# Patient Record
Sex: MALE | Race: WHITE | NOT HISPANIC OR LATINO | Employment: FULL TIME | URBAN - METROPOLITAN AREA
[De-identification: names, ages, dates, MRNs, and addresses within clinical notes are randomized per-mention and may not be internally consistent; named-entity substitution may affect disease eponyms.]

---

## 2017-04-04 ENCOUNTER — OFFICE VISIT - HEALTHEAST (OUTPATIENT)
Dept: INTERNAL MEDICINE | Facility: CLINIC | Age: 32
End: 2017-04-04

## 2017-04-04 DIAGNOSIS — R06.2 WHEEZING: ICD-10-CM

## 2017-04-04 DIAGNOSIS — R05.9 COUGH: ICD-10-CM

## 2017-05-25 ENCOUNTER — COMMUNICATION - HEALTHEAST (OUTPATIENT)
Dept: INTERNAL MEDICINE | Facility: CLINIC | Age: 32
End: 2017-05-25

## 2017-05-25 DIAGNOSIS — E03.9 HYPOTHYROID: ICD-10-CM

## 2017-09-25 ENCOUNTER — OFFICE VISIT - HEALTHEAST (OUTPATIENT)
Dept: INTERNAL MEDICINE | Facility: CLINIC | Age: 32
End: 2017-09-25

## 2017-09-25 DIAGNOSIS — E03.9 HYPOTHYROIDISM: ICD-10-CM

## 2017-09-25 DIAGNOSIS — Z87.891 HISTORY OF NICOTINE USE: ICD-10-CM

## 2017-09-25 DIAGNOSIS — H66.90 AOM (ACUTE OTITIS MEDIA): ICD-10-CM

## 2017-09-26 ENCOUNTER — COMMUNICATION - HEALTHEAST (OUTPATIENT)
Dept: INTERNAL MEDICINE | Facility: CLINIC | Age: 32
End: 2017-09-26

## 2017-10-13 ENCOUNTER — COMMUNICATION - HEALTHEAST (OUTPATIENT)
Dept: INTERNAL MEDICINE | Facility: CLINIC | Age: 32
End: 2017-10-13

## 2017-10-13 ENCOUNTER — AMBULATORY - HEALTHEAST (OUTPATIENT)
Dept: INTERNAL MEDICINE | Facility: CLINIC | Age: 32
End: 2017-10-13

## 2017-10-13 DIAGNOSIS — H66.90 AOM (ACUTE OTITIS MEDIA): ICD-10-CM

## 2017-11-16 ENCOUNTER — COMMUNICATION - HEALTHEAST (OUTPATIENT)
Dept: INTERNAL MEDICINE | Facility: CLINIC | Age: 32
End: 2017-11-16

## 2017-11-16 DIAGNOSIS — E03.9 HYPOTHYROID: ICD-10-CM

## 2018-08-06 ENCOUNTER — COMMUNICATION - HEALTHEAST (OUTPATIENT)
Dept: INTERNAL MEDICINE | Facility: CLINIC | Age: 33
End: 2018-08-06

## 2018-08-06 DIAGNOSIS — E03.9 HYPOTHYROID: ICD-10-CM

## 2018-08-27 ENCOUNTER — OFFICE VISIT - HEALTHEAST (OUTPATIENT)
Dept: INTERNAL MEDICINE | Facility: CLINIC | Age: 33
End: 2018-08-27

## 2018-08-27 DIAGNOSIS — Z13.220 LIPID SCREENING: ICD-10-CM

## 2018-08-27 DIAGNOSIS — Z00.00 ANNUAL PHYSICAL EXAM: ICD-10-CM

## 2018-08-27 DIAGNOSIS — E03.9 HYPOTHYROID: ICD-10-CM

## 2018-08-27 DIAGNOSIS — E05.00 GRAVES DISEASE: ICD-10-CM

## 2018-08-27 DIAGNOSIS — E89.0 POSTABLATIVE HYPOTHYROIDISM: ICD-10-CM

## 2018-08-27 LAB
CHOLEST SERPL-MCNC: 155 MG/DL
FASTING STATUS PATIENT QL REPORTED: YES
FASTING STATUS PATIENT QL REPORTED: YES
GLUCOSE BLD-MCNC: 84 MG/DL (ref 70–125)
HDLC SERPL-MCNC: 48 MG/DL
LDLC SERPL CALC-MCNC: 92 MG/DL
T4 FREE SERPL-MCNC: 1.2 NG/DL (ref 0.7–1.8)
TRIGL SERPL-MCNC: 73 MG/DL
TSH SERPL DL<=0.005 MIU/L-ACNC: 0.51 UIU/ML (ref 0.3–5)

## 2018-08-27 ASSESSMENT — MIFFLIN-ST. JEOR: SCORE: 1952.31

## 2018-08-28 ENCOUNTER — COMMUNICATION - HEALTHEAST (OUTPATIENT)
Dept: INTERNAL MEDICINE | Facility: CLINIC | Age: 33
End: 2018-08-28

## 2018-08-28 LAB — T3 SERPL-MCNC: 80 NG/DL (ref 45–175)

## 2018-09-12 ENCOUNTER — COMMUNICATION - HEALTHEAST (OUTPATIENT)
Dept: INTERNAL MEDICINE | Facility: CLINIC | Age: 33
End: 2018-09-12

## 2018-11-14 ENCOUNTER — COMMUNICATION - HEALTHEAST (OUTPATIENT)
Dept: INTERNAL MEDICINE | Facility: CLINIC | Age: 33
End: 2018-11-14

## 2018-11-14 DIAGNOSIS — Z30.2 ENCOUNTER FOR VASECTOMY: ICD-10-CM

## 2018-11-21 ENCOUNTER — HOSPITAL ENCOUNTER (OUTPATIENT)
Dept: LAB | Age: 33
Setting detail: SPECIMEN
Discharge: HOME | End: 2018-11-21

## 2018-11-21 ENCOUNTER — OFFICE VISIT - HEALTHEAST (OUTPATIENT)
Dept: INTERNAL MEDICINE | Facility: CLINIC | Age: 33
End: 2018-11-21

## 2018-11-21 DIAGNOSIS — H92.13 OTORRHEA OF BOTH EARS: ICD-10-CM

## 2018-11-24 ENCOUNTER — COMMUNICATION - HEALTHEAST (OUTPATIENT)
Dept: INTERNAL MEDICINE | Facility: CLINIC | Age: 33
End: 2018-11-24

## 2018-11-24 LAB
BACTERIA SPEC CULT: ABNORMAL
BACTERIA SPEC CULT: ABNORMAL

## 2018-12-19 ENCOUNTER — OFFICE VISIT - HEALTHEAST (OUTPATIENT)
Dept: OTOLARYNGOLOGY | Facility: CLINIC | Age: 33
End: 2018-12-19

## 2018-12-19 ENCOUNTER — OFFICE VISIT - HEALTHEAST (OUTPATIENT)
Dept: AUDIOLOGY | Facility: CLINIC | Age: 33
End: 2018-12-19

## 2018-12-19 DIAGNOSIS — H73.13 CHRONIC MYRINGITIS OF BOTH EARS: ICD-10-CM

## 2018-12-27 ENCOUNTER — RECORDS - HEALTHEAST (OUTPATIENT)
Dept: ADMINISTRATIVE | Facility: OTHER | Age: 33
End: 2018-12-27

## 2019-01-25 ENCOUNTER — RECORDS - HEALTHEAST (OUTPATIENT)
Dept: ADMINISTRATIVE | Facility: OTHER | Age: 34
End: 2019-01-25

## 2019-08-13 ENCOUNTER — COMMUNICATION - HEALTHEAST (OUTPATIENT)
Dept: INTERNAL MEDICINE | Facility: CLINIC | Age: 34
End: 2019-08-13

## 2019-08-13 DIAGNOSIS — E03.9 HYPOTHYROID: ICD-10-CM

## 2019-10-09 ENCOUNTER — OFFICE VISIT - HEALTHEAST (OUTPATIENT)
Dept: INTERNAL MEDICINE | Facility: CLINIC | Age: 34
End: 2019-10-09

## 2019-10-09 ENCOUNTER — HOSPITAL ENCOUNTER (OUTPATIENT)
Dept: LAB | Age: 34
Setting detail: SPECIMEN
Discharge: HOME | End: 2019-10-09

## 2019-10-09 DIAGNOSIS — E03.9 HYPOTHYROID: ICD-10-CM

## 2019-10-09 DIAGNOSIS — Z00.00 ANNUAL PHYSICAL EXAM: ICD-10-CM

## 2019-10-09 LAB — TSH SERPL DL<=0.005 MIU/L-ACNC: 0.68 UIU/ML (ref 0.3–5)

## 2019-10-09 ASSESSMENT — MIFFLIN-ST. JEOR: SCORE: 1893.34

## 2020-10-12 ENCOUNTER — COMMUNICATION - HEALTHEAST (OUTPATIENT)
Dept: INTERNAL MEDICINE | Facility: CLINIC | Age: 35
End: 2020-10-12

## 2020-10-12 DIAGNOSIS — E03.9 HYPOTHYROID: ICD-10-CM

## 2020-12-01 ENCOUNTER — OFFICE VISIT - HEALTHEAST (OUTPATIENT)
Dept: INTERNAL MEDICINE | Facility: CLINIC | Age: 35
End: 2020-12-01

## 2020-12-01 DIAGNOSIS — E89.0 POSTABLATIVE HYPOTHYROIDISM: ICD-10-CM

## 2020-12-01 DIAGNOSIS — Z00.00 ANNUAL PHYSICAL EXAM: ICD-10-CM

## 2020-12-01 LAB — TSH SERPL DL<=0.005 MIU/L-ACNC: 2.95 UIU/ML (ref 0.3–5)

## 2020-12-01 ASSESSMENT — MIFFLIN-ST. JEOR: SCORE: 1861.59

## 2020-12-21 ENCOUNTER — COMMUNICATION - HEALTHEAST (OUTPATIENT)
Dept: FAMILY MEDICINE | Facility: CLINIC | Age: 35
End: 2020-12-21

## 2020-12-21 ENCOUNTER — RECORDS - HEALTHEAST (OUTPATIENT)
Dept: SCHEDULING | Facility: CLINIC | Age: 35
End: 2020-12-21

## 2020-12-21 DIAGNOSIS — E03.9 HYPOTHYROID: ICD-10-CM

## 2020-12-22 RX ORDER — LEVOTHYROXINE SODIUM 175 UG/1
175 TABLET ORAL DAILY
Qty: 90 TABLET | Refills: 3 | Status: SHIPPED | OUTPATIENT
Start: 2020-12-22 | End: 2021-10-26

## 2021-03-16 ENCOUNTER — OFFICE VISIT - HEALTHEAST (OUTPATIENT)
Dept: INTERNAL MEDICINE | Facility: CLINIC | Age: 36
End: 2021-03-16

## 2021-03-16 DIAGNOSIS — M79.675 PAIN OF TOE OF LEFT FOOT: ICD-10-CM

## 2021-03-16 DIAGNOSIS — S93.509A SPRAIN OF TOE, INITIAL ENCOUNTER: ICD-10-CM

## 2021-04-12 ENCOUNTER — COMMUNICATION - HEALTHEAST (OUTPATIENT)
Dept: ADMINISTRATIVE | Facility: CLINIC | Age: 36
End: 2021-04-12

## 2021-05-30 ENCOUNTER — RECORDS - HEALTHEAST (OUTPATIENT)
Dept: ADMINISTRATIVE | Facility: CLINIC | Age: 36
End: 2021-05-30

## 2021-05-30 VITALS — WEIGHT: 212 LBS

## 2021-05-31 ENCOUNTER — RECORDS - HEALTHEAST (OUTPATIENT)
Dept: ADMINISTRATIVE | Facility: CLINIC | Age: 36
End: 2021-05-31

## 2021-05-31 VITALS — WEIGHT: 230 LBS

## 2021-05-31 NOTE — TELEPHONE ENCOUNTER
Refill Approved    Rx renewed per Medication Renewal Policy. Medication was last renewed on 8/27/2018 for 90/3.  Last OV 8/27/2018  Flavia Dyer, [facility] Triage/Med Refill 8/14/2019     Requested Prescriptions   Pending Prescriptions Disp Refills     levothyroxine (SYNTHROID, LEVOTHROID) 175 MCG tablet [Pharmacy Med Name: LEVOTHYROXINE 0.175MG (175MCG) TABS] 90 tablet 0     Sig: TAKE 1 TABLET(175 MCG) BY MOUTH DAILY       Thyroid Hormones Protocol Passed - 8/13/2019  3:17 AM        Passed - Provider visit in past 12 months or next 3 months     Last office visit with prescriber/PCP: 11/21/2018 Jaclyn López FNP OR same dept: 11/21/2018 Jaclyn López FNP OR same specialty: 11/21/2018 Jaclyn López FNP  Last physical: Visit date not found Last MTM visit: Visit date not found   Next visit within 3 mo: Visit date not found  Next physical within 3 mo: Visit date not found  Prescriber OR PCP: RAMIRO Ortez  Last diagnosis associated with med order: 1. Hypothyroid  - levothyroxine (SYNTHROID, LEVOTHROID) 175 MCG tablet [Pharmacy Med Name: LEVOTHYROXINE 0.175MG (175MCG) TABS]; TAKE 1 TABLET(175 MCG) BY MOUTH DAILY  Dispense: 90 tablet; Refill: 0    If protocol passes may refill for 12 months if within 3 months of last provider visit (or a total of 15 months).             Passed - TSH on file in past 12 months for patient age 12 & older     TSH   Date Value Ref Range Status   08/27/2018 0.51 0.30 - 5.00 uIU/mL Final

## 2021-06-01 VITALS — WEIGHT: 231 LBS | HEIGHT: 68 IN | BODY MASS INDEX: 35.01 KG/M2

## 2021-06-02 VITALS — BODY MASS INDEX: 36.49 KG/M2 | WEIGHT: 240 LBS

## 2021-06-02 NOTE — PROGRESS NOTES
Assessment/Plan:     1. Annual physical exam  2. BMI 33.0-33.9,adult  - I recommended he eat a healthy diet and exercise on a regular basis.    3. Hypothyroid  - Thyroid Stimulating Hormone (TSH)  - levothyroxine (SYNTHROID, LEVOTHROID) 175 MCG tablet; Take 1 tablet (175 mcg total) by mouth daily.  Dispense: 90 tablet; Refill: 3      Subjective:     Gustavo Becerra is a 34 y.o. male who presents for an annual exam. He has no additional concerns today.     Hypothyroidism r/t BASSETT treatment for graves disease is treated with levothyroxine. He denies hyper/hypothyroid symptoms.     The patient reports that there is not domestic violence in his life.     Healthy Habits:   Regular Exercise: Yes, physically active with his new job working for [employer]   Sunscreen Use: Yes  Healthy Diet: Yes  Dental Visits Regularly: Yes    Immunization History   Administered Date(s) Administered     Td,adult,historic,unspecified 1985     Tdap 12/10/2012     Immunization status: declines influenza vaccine       Current Outpatient Medications   Medication Sig Dispense Refill     fluticasone (FLONASE) 50 mcg/actuation nasal spray 1 spray into each nostril daily. 16 g 0     levothyroxine (SYNTHROID, LEVOTHROID) 175 MCG tablet Take 1 tablet (175 mcg total) by mouth daily. 90 tablet 3     No current facility-administered medications for this visit.      Past Medical History:   Diagnosis Date     Disease of thyroid gland      Past Surgical History:   Procedure Laterality Date     ADENOIDECTOMY       Bee sting kit  Family History   Problem Relation Age of Onset     Cancer Mother         Primary liver cancer     Social History     Socioeconomic History     Marital status: Single     Spouse name: Not on file     Number of children: 2     Years of education: Not on file     Highest education level: Not on file   Occupational History     Occupation:     Social Needs     Financial resource strain: Not on file     Food insecurity:      Worry: Not on file     Inability: Not on file     Transportation needs:     Medical: Not on file     Non-medical: Not on file   Tobacco Use     Smoking status: Former Smoker     Packs/day: 0.25     Years: 10.00     Pack years: 2.50     Last attempt to quit: 2017     Years since quittin.7     Smokeless tobacco: Never Used   Substance and Sexual Activity     Alcohol use: Yes     Alcohol/week: 6.0 standard drinks     Types: 6 Cans of beer per week     Comment: Maximum off 6/week, could be fewer.  TBrinkMD - 14     Drug use: No     Sexual activity: Yes     Partners: Female   Lifestyle     Physical activity:     Days per week: Not on file     Minutes per session: Not on file     Stress: Not on file   Relationships     Social connections:     Talks on phone: Not on file     Gets together: Not on file     Attends Holiness service: Not on file     Active member of club or organization: Not on file     Attends meetings of clubs or organizations: Not on file     Relationship status: Not on file     Intimate partner violence:     Fear of current or ex partner: Not on file     Emotionally abused: Not on file     Physically abused: Not on file     Forced sexual activity: Not on file   Other Topics Concern     Not on file   Social History Narrative    , 3 children        Review of Systems  General:  Negative except as noted above  Eyes: Negative except as noted above  Ears/Nose/Throat: Negative except as noted above  Cardiovascular: Negative except as noted above  Respiratory:  Negative except as noted above  Gastrointestinal:  Negative except as noted above  Musculoskeletal:  Negative except as noted above  Skin: Negative except as noted above  Neurologic: Negative except as noted above  Psychiatric: Negative except as noted above  Endocrine: Negative except as noted above  Heme/Lymphatic: Negative except as noted above   Allergic/Immunologic: Negative except as noted above      Objective:      Vitals:     "10/09/19 1209   BP: 110/72   Pulse: 80   Weight: 218 lb (98.9 kg)   Height: 5' 8\" (1.727 m)     Wt Readings from Last 3 Encounters:   10/09/19 218 lb (98.9 kg)   11/21/18 (!) 240 lb (108.9 kg)   08/27/18 (!) 231 lb (104.8 kg)     Body mass index is 33.15 kg/m .     Physical Exam:  Constitutional: Well developed, well nourished, no acute distress.  HEENT: normocephalic/atraumatic, PERRLA/EOMI, TMs: Gray, normal light reflex, no nasal discharge.  Oral mucosa: moist; no erythema/exudate  Neck: No LAD/masses/thyromegaly/bruits  Lungs: clear bilaterally  Heart: regular rate and rhythm, no murmurs/gallops/rubs  Abdomen: Normal bowel sounds, soft, non-tender, non-distended, no masses  Lymphatics: no supraclavicular/cervical LAD. No edema.  Neuro: A&O x 3  Musculoskeletal: no gross deformities.  Skin: no rashes or lesions.  Psych: Behavior appropriate, engaging.  Thought processes congruent, non-tangential        "

## 2021-06-03 VITALS
BODY MASS INDEX: 33.04 KG/M2 | WEIGHT: 218 LBS | SYSTOLIC BLOOD PRESSURE: 110 MMHG | HEIGHT: 68 IN | HEART RATE: 80 BPM | DIASTOLIC BLOOD PRESSURE: 72 MMHG

## 2021-06-05 VITALS
WEIGHT: 211 LBS | SYSTOLIC BLOOD PRESSURE: 124 MMHG | DIASTOLIC BLOOD PRESSURE: 74 MMHG | BODY MASS INDEX: 32.08 KG/M2 | HEART RATE: 80 BPM

## 2021-06-05 VITALS
DIASTOLIC BLOOD PRESSURE: 74 MMHG | BODY MASS INDEX: 31.98 KG/M2 | SYSTOLIC BLOOD PRESSURE: 120 MMHG | HEIGHT: 68 IN | HEART RATE: 76 BPM | WEIGHT: 211 LBS

## 2021-06-09 NOTE — PROGRESS NOTES
[facility] Note  Patient Name: Gustavo Becerra  Patient Age: 31 y.o.  YOB: 1985  MRN: [id number]  ?  Date of Visit: 4/4/2017  Reason for Office Visit:   Chief Complaint   Patient presents with     Follow-up     got influenza last week, still not getting better, having a hard time breathing       HPI: Gustavo Becerra 31 y.o. male who presents to clinic for headache, productive cough of green mucus, ears plugged, wheezing, tactile fever. These symptoms started back over the weekend 2 days ago. No chest pain, sob, lou. [facility], was told he had the flu without testing. Was given 5 days off work. Started to feel better then on Sunday started coughing again.       Review of Systems: As noted in HPI     Current Scheduled Meds:  Outpatient Encounter Prescriptions as of 4/4/2017   Medication Sig Dispense Refill     levothyroxine (SYNTHROID, LEVOTHROID) 175 MCG tablet TAKE ONE TABLET BY MOUTH ONE TIME DAILY 90 tablet 2     albuterol (PROVENTIL HFA;VENTOLIN HFA) 90 mcg/actuation inhaler Inhale 2 puffs every 6 (six) hours as needed for wheezing. 1 each 0     No facility-administered encounter medications on file as of 4/4/2017.        Objective / Physical Examination:  /82  Pulse (!) 103  Temp 98.9  F (37.2  C) (Oral)   Wt 212 lb (96.2 kg)  SpO2 98%  Wt Readings from Last 3 Encounters:   04/04/17 212 lb (96.2 kg)   05/16/16 194 lb 12.8 oz (88.4 kg)   12/12/14 201 lb (91.2 kg)     There is no height or weight on file to calculate BMI. (>25?)    General Appearance: Alert and oriented in no acute distress  Ears: Tympanic membrane clear with landmarks well visualized bilaterally  Eyes: Conjunctivae clear   Nose: Septum midline, nares patent, mucosa moist and without drainage  Throat: Lips and mucosa moist pharynx without erythema or exudate  Neck: No cervical adenopathy.   Lungs: Clear to auscultation bilaterally. Normal inspiratory and expiratory effort. No  w/r/r  Cardiovascular: RRR   Abdomen: Bowel sounds active all four quadrants. Soft, non-tender.   Integumentary: Warm and dry    Assessment / Plan / Medical Decision Making:      Encounter Diagnoses   Name Primary?     Cough Yes     Wheezing         1. Cough    2 days of productive cough. Lungs CTA. Slightly tachy on exam, otherwise hemodynamically stable. Will treat this symptomatically as a viral infection. Given albuterol inhaler and advised mucinex      - albuterol (PROVENTIL HFA;VENTOLIN HFA) 90 mcg/actuation inhaler; Inhale 2 puffs every 6 (six) hours as needed for wheezing.  Dispense: 1 each; Refill: 0    2. Wheezing albuterol as above    Follow up if not improving or getting worse    Total time spent with patient was 15 minutes with >50% of time spent in face-to-face counseling regarding the above plan     Demarcus Benites MD  [facility]

## 2021-06-12 NOTE — TELEPHONE ENCOUNTER
RN cannot approve Refill Request    RN can NOT refill this medication Protocol failed and NO refill given. Last office visit: 10/9/2019 Jaclyn López FNP Last Physical: Visit date not found Last MTM visit: Visit date not found Last visit same specialty: 10/9/2019 Jaclyn López FNP.  Next visit within 3 mo: Visit date not found  Next physical within 3 mo: Visit date not found      Ellie Black, [facility] Triage/Med Refill 10/13/2020    Requested Prescriptions   Pending Prescriptions Disp Refills     levothyroxine (SYNTHROID, LEVOTHROID) 175 MCG tablet [Pharmacy Med Name: LEVOTHYROXINE 0.175MG (175MCG) TABS] 90 tablet 3     Sig: TAKE 1 TABLET BY MOUTH EVERY DAY       Thyroid Hormones Protocol Failed - 10/12/2020  6:38 PM        Failed - Provider visit in past 12 months or next 3 months     Last office visit with prescriber/PCP: 10/9/2019 Jaclyn López FNP OR same dept: Visit date not found OR same specialty: 10/9/2019 Jaclyn López FNP  Last physical: Visit date not found Last MTM visit: Visit date not found   Next visit within 3 mo: Visit date not found  Next physical within 3 mo: Visit date not found  Prescriber OR PCP: RAMIRO Ortez  Last diagnosis associated with med order: 1. Hypothyroid  - levothyroxine (SYNTHROID, LEVOTHROID) 175 MCG tablet [Pharmacy Med Name: LEVOTHYROXINE 0.175MG (175MCG) TABS]; TAKE 1 TABLET BY MOUTH EVERY DAY  Dispense: 90 tablet; Refill: 3    If protocol passes may refill for 12 months if within 3 months of last provider visit (or a total of 15 months).             Failed - TSH on file in past 12 months for patient age 12 & older     TSH   Date Value Ref Range Status   10/09/2019 0.68 0.30 - 5.00 uIU/mL Final

## 2021-06-13 NOTE — TELEPHONE ENCOUNTER
Reason for Call:  Medication or medication refill: refilll wants 90 day supply    Do you use a [facility]?  Name of the pharmacy and phone number for the current request: [facility] [address]    Name of the medication requested: SYNTHROID    Other request: NONE    Can we leave a detailed message on this number? Yes    Phone number patient can be reached at:   Cell number on file:    Telephone Information:   Mobile [phone]       Best Time: NONE    Call taken on 12/21/2020 at 4:16 PM by Dmitriy Castle

## 2021-06-13 NOTE — PROGRESS NOTES
Assessment/Plan:     1. Annual physical exam  2. BMI 33.0-33.9,adult  3. Postablative hypothyroidism  I recommended he eat a healthy diet and exercise on a regular basis.  Recommend reducing alcohol intake   - Thyroid Stimulating Hormone (TSH)           Subjective:     Gustavo Becerra is a 35 y.o. male who presents for an annual exam. He has continued to work without interruption throughout the pandemic. His children are doing virtual learning which has been difficult.     He has postablative hypothyroidism, does not notice any symptoms to suggest under or over replacement with levothyroxine.     The patient reports that there is not domestic violence in his life.     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 Has continued to lose weight with intentional measures   Dental Visits Regularly: Yes      Immunization History   Administered Date(s) Administered     Influenza, inj, historic,unspecified 12/24/2018, 10/10/2019     Td,adult,historic,unspecified 1985     Tdap 12/10/2012           Current Outpatient Medications   Medication Sig Dispense Refill     fluticasone (FLONASE) 50 mcg/actuation nasal spray 1 spray into each nostril daily. 16 g 0     levothyroxine (SYNTHROID, LEVOTHROID) 175 MCG tablet Take 1 tablet (175 mcg total) by mouth daily. Due for appointment 90 tablet 0     No current facility-administered medications for this visit.      Past Medical History:   Diagnosis Date     Disease of thyroid gland      Past Surgical History:   Procedure Laterality Date     ADENOIDECTOMY       Bee sting kit  Family History   Problem Relation Age of Onset     Cancer Mother         Primary liver cancer     Social History     Socioeconomic History     Marital status: Single     Spouse name: Not on file     Number of children: 2     Years of education: Not on file     Highest education level: Not on file   Occupational  History     Occupation:     Social Needs     Financial resource strain: Not on file     Food insecurity     Worry: Not on file     Inability: Not on file     Transportation needs     Medical: Not on file     Non-medical: Not on file   Tobacco Use     Smoking status: Former Smoker     Packs/day: 0.25     Years: 10.00     Pack years: 2.50     Quit date: 01/2017     Years since quitting: 3.9     Smokeless tobacco: Never Used   Substance and Sexual Activity     Alcohol use: Yes     Alcohol/week: 12.0 standard drinks     Types: 12 Cans of beer per week     Drug use: No     Sexual activity: Yes     Partners: Female   Lifestyle     Physical activity     Days per week: Not on file     Minutes per session: Not on file     Stress: Not on file   Relationships     Social connections     Talks on phone: Not on file     Gets together: Not on file     Attends Latter day service: Not on file     Active member of club or organization: Not on file     Attends meetings of clubs or organizations: Not on file     Relationship status: Not on file     Intimate partner violence     Fear of current or ex partner: Not on file     Emotionally abused: Not on file     Physically abused: Not on file     Forced sexual activity: Not on file   Other Topics Concern     Not on file   Social History Narrative    , 3 children        Review of Systems  General:  Negative except as noted above  Eyes: Negative except as noted above  Ears/Nose/Throat: Negative except as noted above  Cardiovascular: Negative except as noted above  Respiratory:  Negative except as noted above  Gastrointestinal:  Negative except as noted above  Musculoskeletal:  Negative except as noted above  Skin: Negative except as noted above  Neurologic: Negative except as noted above  Psychiatric: Negative except as noted above  Endocrine: Negative except as noted above  Heme/Lymphatic: Negative except as noted above   Allergic/Immunologic: Negative except as noted  "above      Objective:      Vitals:    12/01/20 0804   BP: 120/74   Pulse: 76   Weight: 211 lb (95.7 kg)   Height: 5' 8\" (1.727 m)     Wt Readings from Last 3 Encounters:   12/01/20 211 lb (95.7 kg)   10/09/19 218 lb (98.9 kg)   11/21/18 (!) 240 lb (108.9 kg)     Body mass index is 32.08 kg/m .     Physical Exam:  Constitutional: Well developed, well nourished, no acute distress.  HEENT: normocephalic/atraumatic. Eyes non-icteric. TMs: Gray, normal light reflex, no nasal discharge.  Oral mucosa: moist; no erythema/exudate  Neck: No LAD/masses/thyromegaly/bruits  Lungs: clear bilaterally  Heart: regular rate and rhythm, no murmurs/gallops/rubs  Abdomen: Normal bowel sounds, soft, non-tender, non-distended, no masses  Lymphatics: no supraclavicular/cervical LAD. No edema.  Neuro: A&O x 3  Musculoskeletal: no gross deformities.  Skin: no rashes or lesions.  Psych: Behavior appropriate, engaging.  Thought processes congruent, non-tangential        "

## 2021-06-13 NOTE — PROGRESS NOTES
Internal Medicine Office Visit  Patient Name: Gustavo Becerra  Patient Age: 32 y.o.  YOB: 1985  MRN: [id number]  ?  Date of Visit: 2017  Reason for Office Visit:   Chief Complaint   Patient presents with     Establish Care       Assessment / Plan / Medical Decision Makin. Hypothyroidism, after BASSETT  2. AOM (acute otitis media)  3. History of nicotine use  - Check thyroid cascade today, consider increasing levothyroxine if TSH is higher than usual d/t weight gain  - Amoxicillin for bilateral ear infection   - Per patient, biometric screening done through work and normal with the exception of a high blood pressure which is normal today in the office  - Follow up in 1 year      Health Maintenance Review  Health Maintenance   Topic Date Due     ADVANCE DIRECTIVES DISCUSSED WITH PATIENT  2003     INFLUENZA VACCINE RULE BASED (1) 2017     TD 18+ HE  12/10/2022     TDAP ADULT ONE TIME DOSE  Completed         I have discontinued Mr. Becerra's albuterol. I am also having him start on amoxicillin. Additionally, I am having him maintain his levothyroxine.     HPI:   Encounter Diagnoses   Name Primary?     Hypothyroidism, after BASSETT Yes     AOM (acute otitis media)      History of nicotine use       Gustavo Becerra is a 31 y/o male who presents to the office today to establish care and for bilateral ear pain.     C/o 2 months of a white colored discharge from the ears, it has a foul odor. Drainage is clear to white colored. He has been using a solution of peroxide in the ears which doesn't seem to have resolved the issue. He has intermittent pain in the ears. He has to wear an earpiece at work, he thinks this may be related. He has more difficulty hearing than usual. In the past 1 week it seems slightly better.     Hypothyroidism- He has had weight gain since January. He has tried cutting back on his portion sizes and increased physical activity. He has been unable to lose weight. He has  dry skin patches in the winter, nothing recently. He has been on levothyroxine 175 mcg for the past 2 years approximately with TSH in a normal range with this dose.     Tobacco use- stopped smoking in January.     His wife is expecting another child any day now. This will be #3.     Review of Systems: If positive, the following ROS is bolded:  Constitutional: Fever, chills, night sweats fainting, weight gain  Eyes: Visual changes, eye pain, double vision  HENT: Changes in hearing, ear pain, tinnitus, hoarseness, difficulty swallowing  Respiratory: Wheeze, shortness of breath, cough, and exercise intolerance   Cardiovascular: Chest pain, dyspnea, tachycardia, palpitations, syncope, dizziness or lightheadedness  Gastrointestinal: Nausea, vomiting, diarrhea, dyspepsia, irregular BMs, and melena   Genitourinary: Dysuria, frequency, hematuria, nocturia  Integumentary: Pruritis, rashes, lesions, wounds   Musculoskeletal: Muscular or joint pain, difficulty with range of motion  Neurological: Changes in balance, mental status, paresthesias, weakness, headache  Behavioral/Psych: Difficulty concentrating, excessive moodiness, depression or anxiety, insomnia    Current Scheduled Meds:  Outpatient Encounter Prescriptions as of 9/25/2017   Medication Sig Dispense Refill     levothyroxine (SYNTHROID, LEVOTHROID) 175 MCG tablet TAKE 1 TABLET DAILY 90 tablet 0     amoxicillin (AMOXIL) 875 MG tablet Take 1 tablet (875 mg total) by mouth 2 (two) times a day for 10 days. 20 tablet 0     [DISCONTINUED] albuterol (PROVENTIL HFA;VENTOLIN HFA) 90 mcg/actuation inhaler Inhale 2 puffs every 6 (six) hours as needed for wheezing. 1 each 0     No facility-administered encounter medications on file as of 9/25/2017.      Past Medical History:   Diagnosis Date     Disease of thyroid gland      Past Surgical History:   Procedure Laterality Date     ADENOIDECTOMY       Social History   Substance Use Topics     Smoking status: Former Smoker      Packs/day: 0.25     Years: 10.00     Quit date: 01/2017     Smokeless tobacco: Never Used     Alcohol use 3.6 oz/week     6 Cans of beer per week      Comment: Maximum off 6/week, could be fewer.  TBrinkMD - 12/12/14       Objective / Physical Examination:  Vitals:    09/25/17 0701   BP: 124/70   Pulse: 68   Weight: (!) 230 lb (104.3 kg)     Wt Readings from Last 3 Encounters:   09/25/17 (!) 230 lb (104.3 kg)   04/04/17 212 lb (96.2 kg)   05/16/16 194 lb 12.8 oz (88.4 kg)     There is no height or weight on file to calculate BMI.      General Appearance: Alert and oriented, cooperative, affect appropriate, speech clear, in no apparent distress  Head: Normocephalic, atraumatic  Ears: Tympanic membrane with marked erythema and landmarks obliterated. No drainage in ear canals.   Throat: Lips and mucosa moist. Teeth in good repair, pharynx without erythema or exudate  Neck: Supple, trachea midline. No cervical adenopathy. No thyromegaly or thyroid nodules   Lungs: Clear to auscultation bilaterally. Normal inspiratory and expiratory effort  Cardiovascular: Regular rate, normal S1, S2. No murmurs, rubs, or gallops      Orders Placed This Encounter   Procedures     Thyroid [place]   Followup: Return in about 1 year (around 9/25/2018) for Annual physical. earlier if needed.        Jaclyn López, CNP  [facility]

## 2021-06-16 NOTE — TELEPHONE ENCOUNTER
Patient calling this morning to report that his employer has faxed over some paperwork to our office for Jaclyn López to complete and fax back to his work.  He just wanted to give heads up that it's coming if she hasn't gotten it already.    Please call patient with any questions  [phone]

## 2021-06-16 NOTE — PROGRESS NOTES
Internal Medicine Office Visit  [facility]   Patient Name: Gustavo Becerra  Patient Age: 35 y.o.  YOB: 1985  MRN: [id number]    Date of Visit: 3/16/2021  Reason for Office Visit:   Chief Complaint   Patient presents with     Toe Injury     Lt foot, first 3 toes           Assessment / Plan / Medical Decision Making:    Problem List Items Addressed This Visit     None      Visit Diagnoses     Sprain of toe, initial encounter    -  Primary    - Xray results were personally reviewed, I do not appreciate any fractures or dislocations on the imaging.   - Continue ice for 72 hours, elevate, and use NSAID/acetaminophen  - Use post-op or hard soled shoe   - Take 3/18 off work. Lifting restriction until 3/23 of 15 lbs. Can call me if this is not feasible and I can extend the restriction     Relevant Orders    Ankle/Foot DME: Post-op Shoe; Left    Pain of toe of left foot        Relevant Orders    XR Foot Left 3 or More VWS Standing           I am having Jordan Becerra maintain his fluticasone propionate and levothyroxine.          Orders Placed This Encounter   Procedures     Ankle/Foot DME: Post-op Shoe; Left     XR Foot Left 3 or More VWS Standing   Followup: Return in about 2 weeks (around 3/30/2021), or if symptoms worsen or fail to improve. earlier if needed.        Jaclyn López CNP        HPI:  Gustavo Becerra is a 35 y.o. year old who presents to the office today for a left foot injury. He ran into a door frame yesterday when he was playing with his kids. He has swelling and slightly discolored. He has pain with walking and holding any weight. 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         Health Maintenance Review  Health Maintenance   Topic Date Due     ADVANCE CARE PLANNING  03/16/2051 (Originally 5/12/2003)     PREVENTIVE CARE VISIT  12/01/2021     TD 18+ HE  12/10/2022     LIPID  08/27/2023     TDAP ADULT ONE TIME DOSE  Completed      Pneumococcal Vaccine: Pediatrics (0 to 5 Years) and At-Risk Patients (6 to 64 Years)  Aged Out     HEPATITIS B VACCINES  Aged Out     HEPATITIS C SCREENING  Discontinued     HIV SCREENING  Discontinued     INFLUENZA VACCINE RULE BASED  Discontinued       Current Scheduled Meds:  Outpatient Encounter Medications as of 3/16/2021   Medication Sig Dispense Refill     fluticasone (FLONASE) 50 mcg/actuation nasal spray 1 spray into each nostril daily. 16 g 0     levothyroxine (SYNTHROID, LEVOTHROID) 175 MCG tablet Take 1 tablet (175 mcg total) by mouth daily. 90 tablet 3     No facility-administered encounter medications on file as of 3/16/2021.           Objective / Physical Examination:  Vitals:    03/16/21 1317   BP: 124/74   Pulse: 80   Weight: 211 lb (95.7 kg)     Wt Readings from Last 3 Encounters:   03/16/21 211 lb (95.7 kg)   12/01/20 211 lb (95.7 kg)   10/09/19 218 lb (98.9 kg)     Body mass index is 32.08 kg/m .     Constitutional: In no apparent distress  Cardiovascular: DP pulse 2+ left foot   MSK: mild soft tissues swelling over the medial dorsum of the left foot. Tenderness to palpation of the distal metatarsals 1-3 and MTP joints. Movement of the toes is intact. Sensation is intact   Skin: my mild ecchymosis over distal metatarsals and MTP joint

## 2021-06-18 NOTE — PATIENT INSTRUCTIONS - HE
Patient Instructions by Jaclyn López FNP at 3/16/2021  1:15 PM     Author: Jaclyn López FNP Service: -- Author Type: Nurse Practitioner    Filed: 3/16/2021  1:35 PM Encounter Date: 3/16/2021 Status: Signed    : Jaclyn López FNP (Nurse Practitioner)       Patient Education     Toe Sprain  A sprain is a stretching or tearing of the ligaments that hold a joint together. There are no broken bones. Sprains generally take from 3-6 weeks to heal. A toe sprain may be treated by taping the injured toe to the next toe. This is called buddy taping. This protects the injured toe and holds it in position. Mild sprains may not need any additional support. If the toenail has been hurt badly, it may fall off in 1-2 weeks. A new one will usually start to grow back within a month.     Home care    Keep your leg elevated when sitting or lying down. This is very important during the first 48 hours to reduce swelling. Stay off the injured foot as much as possible until you can walk on it without pain. If needed, you may use crutches during the first week for this purpose. Crutches can be rented at many pharmacies or surgical/orthopedic supply stores.    You may be given a cast shoe to wear to prevent movement in your toe. If not, you can use a sandal or any shoe that does not put pressure on the injured toe until the swelling and pain go away. If using a sandal, be careful not to hit your foot against anything, since another injury could make the sprain worse.    Apply an ice pack over the injured area for 15 to 20 minutes every 3 to 6 hours. You should do this for the first 24 to 48 hours. You can make an ice pack by filling a plastic bag that seals at the top with ice cubes and then wrapping it with a thin towel. Continue to use ice packs for relief of pain and swelling as needed. As the ice melts, be careful to avoid getting your wrap, splint, or cast wet. As the ice melts, be careful to avoid getting  any wrap, splint, tape, or cast wet. After 48 hours, apply heat from a warm shower or bath for 20 minutes several times daily. Alternating ice and heat may also be helpful.    If jac tape was applied and it becomes wet or dirty, change it. You may replace it with paper, plastic or cloth tape. Cloth tape and paper tapes must be kept dry. Apply gauze or cotton padding between the toes, especially near webbed area. This will help prevent the skin from getting moist and breaking down. Keep the jac tape in place for at least 4 weeks, or as advised by your healthcare provider.    You may use over-the-counter pain medicine to control pain, unless another pain medicine was prescribed. If you have chronic liver or kidney disease or ever had a stomach ulcer or GI bleeding, talk with your healthcare provider before using these medicines.    You may return to sports after healing, when you can run without pain.  Follow-up care  Follow up with your with your healthcare provider as advised. Sometimes fractures dont show up on the first X-ray. Bruises and sprains can sometimes hurt as much as a fracture. These injuries can take time to heal completely. If your symptoms dont improve or they get worse, talk with your healthcare provider. You may need a repeat X-ray. If X-rays were taken, you will be told of any new findings that may affect your care.  When to seek medical advice  Call your healthcare provider right away if any of these occur:    Redness, warmth, or fluid drainage from your toe    Pain or swelling increases    Toes become cold, blue, numb, or tingly  Date Last Reviewed: 11/20/2015 2000-2017 [employer]. [address] [place] [address], [place] [address]. All rights reserved. This information is not intended as a substitute for professional medical care. Always follow your healthcare professional's instructions.

## 2021-06-20 NOTE — PROGRESS NOTES
"~ You WILL get better~    GENERAL ADVICE  ~ Gradually ease back into your usual activities.  ~ Rest as needed to help your symptoms go away.   - Consider pairing 50 minutes of activity with 10 minutes of rest.  ~ Allow yourself more time for activities.  ~ Write things down.  At home, at work, whenever there is something that you should remember, even it is simple.    SCREENS  ~ Change settings on your phone and computer using the \"Blue Light Filter\" (Night Shift on all [employer] products)   ~ The goal is making screens more yellow and less blue.     ~ If this is not an option you can download this program: ExecNote, to adjust your screen resolution.  ~ Turn screen brightness down  ~ Increase font size    HEADACHE  ~ Take tylenol (1000 mg) three times a day as needed  ~ Take ibuprofen (600 mg) three times a day as needed (take with food)    NECK PAIN  ~ Ice or Heat are good~  ~ Massage is ok if it doesn't trigger more symptoms~  ~ Gentle stretches and range-of-motion are helpful    FATIGUE  ~ Daily exercise is strongly encouraged.  Start with a 10 min walk and increase the time as tolerated until you are walking 30 minutes per day.      ANXIETY OR MOOD SWINGS:  ~ If you are irritable or anxious, take a break in a quiet room.  ~ Try using the free Calm gary for guided breathing and mindfulness/meditation.  ~ Explore Agilys (https://www.headspace.com) for free and easy-to-use meditation guidance.    Diet:  - In principle incorporate more protein, lots of veggies, some fruit, whole grains.    - Less sweets and saturated fat.   - Mediterranean Diet is an easy-to-follow example.  ~ Drink plenty of water throughout the day (8-10 glasses per day)  " Assessment/Plan:     1. Annual physical exam  2. BMI 35.0-35.9,adult  - I recommended he eat a healthy diet and exercise on a regular basis.  - The following high BMI interventions were performed this visit: encouragement to exercise  - Will complete insurance form for patient and fax plus mail him a copy when results are back   - Lipid Profile  - Glucose  - [facility] LAV      3. Postablative hypothyroidism  4. Graves disease - treated with BASSETT.  - Thyroid Stimulating Hormone (TSH)  - T3, Total  - T4, Free    5. Lipid screening  - lipid panel today     6. Hypothyroid  - levothyroxine (SYNTHROID, LEVOTHROID) 175 MCG tablet; Take 1 tablet (175 mcg total) by mouth daily.  Dispense: 90 tablet; Refill: 3        Subjective:     Gustavo Becerra is a 33 y.o. male who presents for an annual exam.  He needs to have fasting lab work done to complete an insurance form which offers him a discount for doing so.  He does not have any new concerns today.    No smoking, quite one year ago.     Hypothyroidism- feels more bloated ever since taking levothyroxine which is entirely unchanged every since age 21 when he was treated.  He otherwise denies any concerns with taking levothyroxine and does not have any symptoms of hyper or hypothyroidism.    The patient reports that there is not domestic violence in his life.     Healthy Habits:   Regular Exercise: Yes, chasing after 3 kids now. Active with sports such as football/t ball with his kids   Sunscreen Use: Yes  Healthy Diet: Yes  Dental Visits Regularly: Yes        Immunization History   Administered Date(s) Administered     Td,adult,historic,unspecified 1985     Tdap 12/10/2012     Immunization status: up to date and documented.        Current Outpatient Prescriptions   Medication Sig Dispense Refill     levothyroxine (SYNTHROID, LEVOTHROID) 175 MCG tablet Take 1 tablet (175 mcg total) by mouth daily. 90 tablet 3     No current facility-administered medications for this visit.   "    Past Medical History:   Diagnosis Date     Disease of thyroid gland      Past Surgical History:   Procedure Laterality Date     ADENOIDECTOMY       Bee sting kit  Family History   Problem Relation Age of Onset     Cancer Mother      Primary liver cancer     Social History     Social History     Marital status: Single     Spouse name: N/A     Number of children: 2     Years of education: N/A     Occupational History            Social History Main Topics     Smoking status: Former Smoker     Packs/day: 0.25     Years: 10.00     Quit date: 01/2017     Smokeless tobacco: Never Used     Alcohol use 3.6 oz/week     6 Cans of beer per week      Comment: Maximum off 6/week, could be fewer.  TBrinkMD - 12/12/14     Drug use: No     Sexual activity: Yes     Partners: Female     Other Topics Concern     Not on file     Social History Narrative    , 3 children        Review of Systems  General:  Negative except as noted above  Eyes: Negative except as noted above  Ears/Nose/Throat: Negative except as noted above  Cardiovascular: Negative except as noted above  Respiratory:  Negative except as noted above  Gastrointestinal:  Negative except as noted above  Musculoskeletal:  Negative except as noted above  Skin: Negative except as noted above  Neurologic: Negative except as noted above  Psychiatric: Negative except as noted above  Endocrine: Negative except as noted above  Heme/Lymphatic: Negative except as noted above   Allergic/Immunologic: Negative except as noted above      Objective:      Vitals:    08/27/18 0907   BP: 124/70   Pulse: 78   Weight: (!) 231 lb (104.8 kg)   Height: 5' 8\" (1.727 m)     Wt Readings from Last 3 Encounters:   08/27/18 (!) 231 lb (104.8 kg)   09/25/17 (!) 230 lb (104.3 kg)   04/04/17 212 lb (96.2 kg)     Body mass index is 35.12 kg/(m^2).     Physical Exam:  Constitutional: Well developed, well nourished, no acute distress.  HEENT: normocephalic/atraumatic, PERRLA/EOMI, TMs: " Gray, normal light reflex, no nasal discharge.  Oral mucosa: moist; no erythema/exudate  Neck: No LAD/masses/thyromegaly/bruits  Lungs: clear bilaterally  Heart: regular rate and rhythm, no murmurs/gallops/rubs  Abdomen: Normal bowel sounds, soft, non-tender, non-distended, no masses, neg Eldridge's/McBurney's, no rebound/guarding  Lymphatics: no supraclavicular/axillary/epitrochlear/inguinal LAD. No edema.  Neuro: A&O x 3, CN II-XII intact  Musculoskeletal: no gross deformities.  Skin: no rashes or lesions.  Psych: Behavior appropriate, engaging.  Thought processes congruent, non-tangential

## 2021-06-21 NOTE — PROGRESS NOTES
Internal Medicine Office Visit  [facility]  Patient Name: Gustavo Becerra  Patient Age: 33 y.o.  YOB: 1985  MRN: [id number]    Date of Visit: 2018  Reason for Office Visit:   Chief Complaint   Patient presents with     Referral     ENT           Assessment / Plan / Medical Decision Makin. Otorrhea of both ears  - Trial OTC antihistamine plus flonase nasal spray  - Ambulatory referral to ENT  - Culture, Ear        Health Maintenance Review  Health Maintenance   Topic Date Due     ADVANCE DIRECTIVES DISCUSSED WITH PATIENT  2003     INFLUENZA VACCINE RULE BASED (1) 2018     TD 18+ HE  12/10/2022     TDAP ADULT ONE TIME DOSE  Completed         I am having Jordan Becerra start on fluticasone. I am also having him maintain his levothyroxine.      HPI:  Gustavo Becerra is a 33 y.o. year old who presents to the office today for c/o bilateral ear discomfort, itchiness, and drainage. Symptoms began about 2 years ago, he has to use a Qtip daily for the drainage that accumulates. He has had an adenoidectomy and multiple sets of tubes as a child.  In 2017 he tried a course of amoxicillin but did not have any resolution of symptoms at that time.  He does have a lot of recurrent sinus drainage.  His hearing is affected at this point, particularly in his left ear      Review of Systems- pertinent positive in bold:  Negative for fever, sharp/severe ear pain       Current Scheduled Meds:  Outpatient Encounter Medications as of 2018   Medication Sig Dispense Refill     levothyroxine (SYNTHROID, LEVOTHROID) 175 MCG tablet Take 1 tablet (175 mcg total) by mouth daily. 90 tablet 3     fluticasone (FLONASE) 50 mcg/actuation nasal spray 1 spray into each nostril daily. 16 g 0     No facility-administered encounter medications on file as of 2018.      Past Medical History:   Diagnosis Date     Disease of thyroid gland      Past Surgical  History:   Procedure Laterality Date     ADENOIDECTOMY       Social History     Tobacco Use     Smoking status: Former Smoker     Packs/day: 0.25     Years: 10.00     Pack years: 2.50     Last attempt to quit: 2017     Years since quittin.8     Smokeless tobacco: Never Used   Substance Use Topics     Alcohol use: Yes     Alcohol/week: 3.6 oz     Types: 6 Cans of beer per week     Comment: Maximum off 6/week, could be fewer.  TBrinkMD - 14     Drug use: No       Objective / Physical Examination:  Vitals:    18 1414   BP: 110/72   Pulse: 64   Weight: (!) 240 lb (108.9 kg)     Wt Readings from Last 3 Encounters:   18 (!) 240 lb (108.9 kg)   18 (!) 231 lb (104.8 kg)   17 (!) 230 lb (104.3 kg)     Body mass index is 36.49 kg/m .     General Appearance: Alert and oriented, cooperative, affect appropriate, speech clear, in no apparent distress  Head: Normocephalic, atraumatic. No sinus percussion tenderness   Ears: Tympanic membrane clear with landmarks well visualized bilaterally- scarring of the right TM. Ear canals are normal.   Nose: Septum midline, nares patent, no visible polyps, mucosa moist and without drainage. Voice is nasal sounding   Throat: Lips and mucosa moist. Teeth in good repair, pharynx without erythema or exudate  Neck: Supple, trachea midline. No cervical adenopathy  .     Orders Placed This Encounter   Procedures     Culture, Ear     Ambulatory referral to ENT   Followup: Return if symptoms worsen or fail to improve. earlier if needed.        Jaclyn López, CNP

## 2021-06-21 NOTE — LETTER
Letter by Jaclyn López FNP at      Author: Jaclyn López FNP Service: -- Author Type: --    Filed:  Encounter Date: 3/16/2021 Status: (Other)         March 16, 2021     Patient: Gustavo Becerra   YOB: 1985   Date of Visit: 3/16/2021       To Whom It May Concern:    It is my medical opinion that Gustavo Becerra will need to be out of work on 3/18/2021 and may return to work on 3/19/2021 with the following restrictions:    - No lifting/pushing/pulling more than 15 lbs     These restrictions are in place until 3/23/21    If you have any questions or concerns, please don't hesitate to call.    Sincerely,        Electronically signed by RAMIRO Ortez

## 2021-06-22 NOTE — PROGRESS NOTES
HISTORY OF PRESENT ILLNESS  Asked to see by RAMIRO Monteiro for ear drainage. Patient reports that he wants his ear checked out. He has had drainage and plugged up ear. He has had recurrent ear infections. His symptoms include foul smelling discharge. He has ears that are full of liquid when he sleeps.     REVIEW OF SYSTEMS  Review of Systems: a 10-system review was performed. Pertinent positives are noted in the HPI and on a separate scanned document in the chart.    PMH, PSH, FH and SH has documented in the EHR.      EXAM    CONSTITUTIONAL  General Appearance:  Normal, well developed, well nourished, no obvious distress  Ability to Communicate:  communicates appropriately.    HEAD AND FACE  Appearance and Symmetry:  Normal, no scalp or facial scarring or suspicious lesions.  Paranasal sinuses tenderness:  Normal, Paranasal sinuses non tender    EARS  Clinical speech reception threshold:  Normal, able to hear normal speech.  Auricle:  Normal, Auricles without scars, lesions, masses.  External auditory canal:  Normal, External auditory canal normal.  Tympanic membrane: Myringitis TM.    NOSE (speculum or scope)  Architecture:  Normal, Grossly normal external nasal architecture with no masses or lesions.  Mucosa:  Normal mucosa, No polyps or masses.  Septum:  Normal, Septum non-obstructing.  Turbinates:  Normal, No turbinate abnormalities    ORAL CAVITY AND OROPHARYNX  Lips:  Normal.  Dental and gingiva:  Normal, No obvious dental or gingival disease.  Mucosa:  Normal, Moist mucous membranes.  Tongue:  Normal, Tongue mobile with no mucosal abnormalities  Hard and soft palate:  Normal, Hard and soft palate without cleft or mucosal lesions.  Oral pharynx:  Normal, Posterior pharynx without lesions or remarkable asymmetry.  Saliva:  Normal, Clear saliva.  Masses:  Normal, No palpable masses or pathologically enlarged lymph nodes.    NECK  Masses/lymph nodes:  Normal, No worrisome neck masses or lymph nodes.  Salivary  glands:  Normal, Parotid and submandibular glands.  Trachea and larynx position:  Normal, Trachea and larynx midline.  Thyroid:  Normal, No thyroid abnormality.  Tenderness:  Normal, No cervical tenderness.  Suppleness:  Normal, Neck supple    NEUROLOGICAL  Speech pattern:  Normal, Proasaic    RESPIRATORY  Symmetry and Respiratory effort:  Normal, Symmetric chest movement and expansion with no increased intercostal retractions or use of accessory muscles.     IMPRESSION  Myringitis of the TM.    RECOMMENDATION  Ciprodex ear drops. Follow up if symptoms are not improved.     Iggy Chen MD

## 2021-06-27 ENCOUNTER — HEALTH MAINTENANCE LETTER (OUTPATIENT)
Age: 36
End: 2021-06-27

## 2021-09-21 ENCOUNTER — MYC REFILL (OUTPATIENT)
Dept: FAMILY MEDICINE | Facility: CLINIC | Age: 36
End: 2021-09-21

## 2021-09-21 DIAGNOSIS — E03.9 HYPOTHYROID: ICD-10-CM

## 2021-09-21 RX ORDER — LEVOTHYROXINE SODIUM 175 UG/1
175 TABLET ORAL DAILY
Qty: 90 TABLET | Refills: 3 | OUTPATIENT
Start: 2021-09-21

## 2021-09-22 NOTE — TELEPHONE ENCOUNTER
Patient should have refill remaining on file at pharmacy.  Levothyroxine filled 12/22/2020 #90 tablets with 3 refills.    levothyroxine (SYNTHROID, LEVOTHROID) 175 MCG tablet 90 tablet 3 12/22/2020  No   Sig - Route: Take 1 tablet (175 mcg total) by mouth daily. - Oral   Sent to pharmacy as: levothyroxine 175 mcg tablet (SYNTHROID, LEVOTHROID)   E-Prescribing Status: Receipt confirmed by pharmacy (12/22/2020  5:16 PM CST       Angeli Kelly, RN  Triage Nurse Advisor

## 2021-10-26 ENCOUNTER — OFFICE VISIT (OUTPATIENT)
Dept: INTERNAL MEDICINE | Facility: CLINIC | Age: 36
End: 2021-10-26
Payer: COMMERCIAL

## 2021-10-26 VITALS
WEIGHT: 218.1 LBS | SYSTOLIC BLOOD PRESSURE: 118 MMHG | BODY MASS INDEX: 33.05 KG/M2 | OXYGEN SATURATION: 100 % | DIASTOLIC BLOOD PRESSURE: 82 MMHG | HEART RATE: 65 BPM | HEIGHT: 68 IN

## 2021-10-26 DIAGNOSIS — E89.0 POSTABLATIVE HYPOTHYROIDISM: Primary | ICD-10-CM

## 2021-10-26 LAB — TSH SERPL DL<=0.005 MIU/L-ACNC: 2.36 UIU/ML (ref 0.3–5)

## 2021-10-26 PROCEDURE — 84443 ASSAY THYROID STIM HORMONE: CPT | Performed by: NURSE PRACTITIONER

## 2021-10-26 PROCEDURE — 99213 OFFICE O/P EST LOW 20 MIN: CPT | Performed by: NURSE PRACTITIONER

## 2021-10-26 PROCEDURE — 36415 COLL VENOUS BLD VENIPUNCTURE: CPT | Performed by: NURSE PRACTITIONER

## 2021-10-26 RX ORDER — LEVOTHYROXINE SODIUM 175 UG/1
175 TABLET ORAL DAILY
Qty: 90 TABLET | Refills: 3 | Status: SHIPPED | OUTPATIENT
Start: 2021-10-26 | End: 2022-10-03

## 2021-10-26 ASSESSMENT — MIFFLIN-ST. JEOR: SCORE: 1893.8

## 2021-10-26 NOTE — PROGRESS NOTES
Internal Medicine Office Visit  [facility]   Patient Name: Gustavo Becerra  Patient Age: 36 year old  YOB: 1985  MRN: [id number]    Date of Visit: 10/26/2021  Patient presents with:  Thyroid Problem: lab test, refill medication           Assessment / Plan / Medical Decision Making:    Problem List Items Addressed This Visit        Endocrine    Postablative hypothyroidism - Primary     TSH today. Continue Levothyroxine.     Pt is euthyroid with today's labwork         Relevant Medications    levothyroxine (SYNTHROID/LEVOTHROID) 175 MCG tablet    Other Relevant Orders    TSH with free T4 reflex (Completed)           I have changed Jordan Becerra's levothyroxine.          Orders Placed This Encounter   Procedures     TSH with free T4 reflex     Followup: Return in about 1 year (around 10/26/2022) for Routine preventive. earlier if needed.    Patient was seen with NP student, Jayshree Benito. I agree with assessment and plan.  Jaclyn López, DNP, APRN, CNP         HPI:  Gustavo Becerra is a 36 year old year old who presents to the office today for Levothyroxine refill and thyroid labs. Denies any hypo/hyperthryoidism symptoms.       Health Maintenance Review  Health Maintenance   Topic Date Due     ANNUAL REVIEW OF HM ORDERS  Never done     ADVANCE CARE PLANNING  03/16/2051 (Originally 1985)     PREVENTIVE CARE VISIT  12/01/2021     DTAP/TDAP/TD IMMUNIZATION (2 - Td or Tdap) 12/10/2022     LIPID  08/27/2023     PHQ-2  Completed     COVID-19 Vaccine  Completed     Pneumococcal Vaccine: Pediatrics (0 to 5 Years) and At-Risk Patients (6 to 64 Years)  Aged Out     IPV IMMUNIZATION  Aged Out     MENINGITIS IMMUNIZATION  Aged Out     HEPATITIS B IMMUNIZATION  Aged Out     HEPATITIS C SCREENING  Discontinued     HIV SCREENING  Discontinued     INFLUENZA VACCINE  Discontinued       Current Scheduled Meds:  Outpatient Encounter Medications as of 10/26/2021   Medication Sig Dispense  "Refill     levothyroxine (SYNTHROID/LEVOTHROID) 175 MCG tablet Take 1 tablet (175 mcg) by mouth daily 90 tablet 3     [DISCONTINUED] fluticasone (FLONASE) 50 mcg/actuation nasal spray [FLUTICASONE (FLONASE) 50 MCG/ACTUATION NASAL SPRAY] 1 spray into each nostril daily. (Patient not taking: Reported on 10/26/2021) 16 g 0     [DISCONTINUED] levothyroxine (SYNTHROID, LEVOTHROID) 175 MCG tablet [LEVOTHYROXINE (SYNTHROID, LEVOTHROID) 175 MCG TABLET] Take 1 tablet (175 mcg total) by mouth daily. 90 tablet 3     No facility-administered encounter medications on file as of 10/26/2021.         Objective / Physical Examination:  Vitals:    10/26/21 1030   BP: 118/82   BP Location: Right arm   Patient Position: Sitting   Cuff Size: Adult Large   Pulse: 65   SpO2: 100%   Weight: 98.9 kg (218 lb 1.6 oz)   Height: 1.727 m (5' 8\")     Wt Readings from Last 3 Encounters:   10/26/21 98.9 kg (218 lb 1.6 oz)   03/16/21 95.7 kg (211 lb)   12/01/20 95.7 kg (211 lb)     Body mass index is 33.16 kg/m .     Constitutional: In no apparent distress  Eyes:  Conjunctivae clear. Non-icteric.   ENT: Tympanic membrane clear with landmarks well visualized bilaterally. Neck is supple, trachea midline. No cervical adenopathy  Respiratory: Clear to auscultation bilaterally. Normal inspiratory and expiratory effort  Cardiovascular: Regular rate and rhythm. No murmurs, rubs, or gallops. No edema. No carotid bruits.   Gastrointestinal: Bowel sounds active all four quadrants. Soft, non-tender.   Skin: Warm and dry. Without suspicious looking lesions  Neuro: Normal gait  Psych: Alert and oriented x3.   "

## 2021-12-01 ENCOUNTER — OFFICE VISIT (OUTPATIENT)
Dept: FAMILY MEDICINE | Facility: CLINIC | Age: 36
End: 2021-12-01
Payer: COMMERCIAL

## 2021-12-01 VITALS
OXYGEN SATURATION: 96 % | DIASTOLIC BLOOD PRESSURE: 100 MMHG | SYSTOLIC BLOOD PRESSURE: 137 MMHG | RESPIRATION RATE: 16 BRPM | TEMPERATURE: 97.6 F | WEIGHT: 223 LBS | HEART RATE: 67 BPM | BODY MASS INDEX: 33.91 KG/M2

## 2021-12-01 DIAGNOSIS — S92.515A CLOSED NONDISPLACED FRACTURE OF PROXIMAL PHALANX OF LESSER TOE OF LEFT FOOT, INITIAL ENCOUNTER: Primary | ICD-10-CM

## 2021-12-01 PROCEDURE — 99214 OFFICE O/P EST MOD 30 MIN: CPT | Performed by: PHYSICIAN ASSISTANT

## 2021-12-01 NOTE — LETTER
[facility]  [address] [address]  Phone: [phone]  Fax: [phone]    December 1, 2021        Gustavo Becerra  [facility] [address]          To whom it may concern:    RE: Gustavo Becerra    Excused from work for 12/1/2021 - 12/8/2021.     Please contact me for questions or concerns.      Sincerely,        Valdemar Corona PA-C

## 2021-12-01 NOTE — PROGRESS NOTES
Assessment & Plan:      Problem List Items Addressed This Visit     None      Visit Diagnoses     Closed nondisplaced fracture of proximal phalanx of lesser toe of left foot, initial encounter    -  Primary    Relevant Orders    XR Foot Left G/E 3 Views (Completed)        Medical Decision Making  Patient presents with ongoing toe pains after injury sustained 2 days ago.  X-ray shows acute fractures of the proximal phalanx of both the fifth and fourth left toes.  There is no significant displacement seen and fractures are closed.  Recommend jac taping and wearing a firm protective walking shoe.  Discussed expected course of improvement and appropriate follow-up.  Discussed signs of worsening symptoms and when to follow-up with PCP if no symptom improvement.     Subjective:      Gustavo Becerra is a 36 year old male here for evaluation of injury to the left foot fifth and fourth toes.  Injury occurred 2 days ago.  Patient was playing with his kids at home when he stubbed his foot on the corner of a brick wall.  Patient noted instant pain.  Since then he has had bruising and swelling throughout the fifth and fourth toes.  Patient has been icing toes frequently and swelling has been gradually improving.  However, he does note significant pain in the toes with weightbearing and with walking.  No previous known fractures in the left foot.  Patient needs a note for work.     The following portions of the patient's history were reviewed and updated as appropriate: allergies, current medications, and problem list.     Review of Systems  Pertinent items are noted in HPI.    Allergies  Allergies   Allergen Reactions     Bee Sting Kit [Bee Venom] Unknown       Family History   Problem Relation Age of Onset     Cancer Mother         Primary liver cancer     Other Cancer Mother         Liver Cancer       Social History     Tobacco Use     Smoking status: Former Smoker     Packs/day: 0.25     Years: 10.00     Pack years:  2.50     Quit date: 2017     Years since quittin.9     Smokeless tobacco: Never Used   Substance Use Topics     Alcohol use: Yes     Alcohol/week: 12.0 standard drinks        Objective:      BP (!) 137/100   Pulse 67   Temp 97.6  F (36.4  C)   Resp 16   Wt 101.2 kg (223 lb)   SpO2 96%   BMI 33.91 kg/m    General appearance - alert, well appearing, and in no distress and non-toxic  Extremities - Left foot: Tenderness to palpation over the fifth and fourth toes throughout, no obvious deformity, no tenderness over the metatarsals or midfoot  Skin - Left foot: Ecchymosis involving the fifth and fourth toes, tissue is normal to touch, no significant swelling, skin intact     The use of Dragon/[employer] dictation services was used to construct the content of this note; any grammatical errors are non-intentional. Please contact the author directly if you are in need of any clarification.

## 2021-12-01 NOTE — PATIENT INSTRUCTIONS
Your x-ray was positive for fracture(s) of the 5th and 4th toe.     For management of pain and swelling:  - Keep splint on at all times to prevent movement at the site of injury  - Apply ice to area of swelling for 20 minutes at a time  - Elevate the injury above your heart to reduce swelling  - May use up to 600mg of Ibuprofen every 6-8 hours as needed for pain and swelling  - If prescribed additional pain medication, use as prescribed    Follow up with [facility] within the next 72 hours. There they will continue ongoing management of your fracture.     Reasons to be seen immediately in the emergency room:  - Worsening numbness  - Develop skin coldness or blue-colored skin  - Severe pain

## 2021-12-08 ENCOUNTER — OFFICE VISIT (OUTPATIENT)
Dept: FAMILY MEDICINE | Facility: CLINIC | Age: 36
End: 2021-12-08
Payer: COMMERCIAL

## 2021-12-08 VITALS
SYSTOLIC BLOOD PRESSURE: 138 MMHG | TEMPERATURE: 98.1 F | RESPIRATION RATE: 12 BRPM | DIASTOLIC BLOOD PRESSURE: 88 MMHG | BODY MASS INDEX: 34.4 KG/M2 | HEART RATE: 68 BPM | HEIGHT: 68 IN | WEIGHT: 227 LBS

## 2021-12-08 DIAGNOSIS — S92.512D CLOSED DISPLACED FRACTURE OF PROXIMAL PHALANX OF LESSER TOE OF LEFT FOOT WITH ROUTINE HEALING: ICD-10-CM

## 2021-12-08 DIAGNOSIS — S92.502D: Primary | ICD-10-CM

## 2021-12-08 PROCEDURE — 99213 OFFICE O/P EST LOW 20 MIN: CPT | Performed by: FAMILY MEDICINE

## 2021-12-08 ASSESSMENT — PAIN SCALES - GENERAL: PAINLEVEL: EXTREME PAIN (8)

## 2021-12-08 ASSESSMENT — MIFFLIN-ST. JEOR: SCORE: 1934.17

## 2021-12-08 NOTE — ASSESSMENT & PLAN NOTE
Fourth and fifth toe at the proximal phalanx.  Does work a job with a lot of walking, pushing and pulling.  Will continue work restrictions.  Continue to use hard soled shoe and jac taping.  Pain relief to the use of Tylenol and ibuprofen discussed.  Follow-up in 1 week for recheck and x-rays to determine healing given the mild displacement of the fourth toe fracture

## 2021-12-08 NOTE — LETTER
December 8, 2021      Gustavo Becerra  [address] [address]        To Whom It May Concern:    Gustavo Becerra  was seen on 12/08/21.  May return to work with restrictions through 7 Jan 2022. Restrictions include no walking over 50 yards, no pushing or pulling. No lifting from standing position. 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 Also when driving cannot use left foot (will require automatic transmission).       Sincerely,        Urban Mehta, DO

## 2021-12-17 ENCOUNTER — OFFICE VISIT (OUTPATIENT)
Dept: FAMILY MEDICINE | Facility: CLINIC | Age: 36
End: 2021-12-17
Payer: COMMERCIAL

## 2021-12-17 ENCOUNTER — ANCILLARY PROCEDURE (OUTPATIENT)
Dept: GENERAL RADIOLOGY | Facility: CLINIC | Age: 36
End: 2021-12-17
Attending: FAMILY MEDICINE
Payer: COMMERCIAL

## 2021-12-17 VITALS
HEIGHT: 68 IN | SYSTOLIC BLOOD PRESSURE: 128 MMHG | WEIGHT: 229.4 LBS | RESPIRATION RATE: 12 BRPM | HEART RATE: 68 BPM | BODY MASS INDEX: 34.77 KG/M2 | TEMPERATURE: 97.8 F | DIASTOLIC BLOOD PRESSURE: 82 MMHG

## 2021-12-17 DIAGNOSIS — S92.512D CLOSED DISPLACED FRACTURE OF PROXIMAL PHALANX OF LESSER TOE OF LEFT FOOT WITH ROUTINE HEALING: ICD-10-CM

## 2021-12-17 DIAGNOSIS — S92.502D: Primary | ICD-10-CM

## 2021-12-17 DIAGNOSIS — S92.502D: ICD-10-CM

## 2021-12-17 PROCEDURE — 99213 OFFICE O/P EST LOW 20 MIN: CPT | Performed by: FAMILY MEDICINE

## 2021-12-17 PROCEDURE — 73620 X-RAY EXAM OF FOOT: CPT | Mod: LT | Performed by: RADIOLOGY

## 2021-12-17 ASSESSMENT — MIFFLIN-ST. JEOR: SCORE: 1945.05

## 2021-12-17 ASSESSMENT — PAIN SCALES - GENERAL: PAINLEVEL: MODERATE PAIN (4)

## 2021-12-17 NOTE — PROGRESS NOTES
"    Problem List Items Addressed This Visit        Musculoskeletal and Integumentary    Closed displaced fracture of proximal phalanx of lesser toe of left foot with routine healing     Both toes healing well with expected healing in the interval.  Continue current work restrictions.  Of note should be able to return to full unrestricted at the end of current restrictions.  If work does need a note he will contact clinic and will be provided.         Relevant Orders    XR Foot Left 2 Views      Other Visit Diagnoses     Closed fracture of fourth toe of left foot with routine healing    -  Primary    Relevant Orders    XR Foot Left 2 Views        Return in about 3 months (around 3/17/2022) for Routine preventive.    Subjective   Jordan is a 36 year old who presents for the following health issues   Follow-up on toe fractures.  Of note injured toes with a mild displacement of the proximal phalanx lateral 2 toes of the right foot 2 weeks ago.  Overall movement is improving.  Still has some discomfort and finds that feels best if he wears his crocs instead of his normal shoes.  Denies any further swelling.  Pain is staying controlled with over-the-counter medications and icing.       Review of Systems   All other systems reviewed and are negative.           Objective    /82 (BP Location: Left arm, Patient Position: Sitting, Cuff Size: Adult Large)   Pulse 68   Temp 97.8  F (36.6  C) (Oral)   Resp 12   Ht 1.727 m (5' 8\")   Wt 104.1 kg (229 lb 6.4 oz)   BMI 34.88 kg/m    Body mass index is 34.88 kg/m .  Physical Exam  Vitals and nursing note reviewed.   Constitutional:       General: He is not in acute distress.     Appearance: Normal appearance. He is not ill-appearing.   HENT:      Head: Normocephalic and atraumatic.   Eyes:      Extraocular Movements: Extraocular movements intact.      Conjunctiva/sclera: Conjunctivae normal.   Pulmonary:      Effort: Pulmonary effort is normal.   Musculoskeletal:      " Comments: Right foot was examined.  No gross deformities.  No redness or swelling of the fracture area.  Full mobility of the joints and ankle.   Neurological:      Mental Status: He is alert and oriented to person, place, and time.   Psychiatric:         Attention and Perception: Attention normal.         Mood and Affect: Mood normal.         Speech: Speech normal.         Thought Content: Thought content normal.        Xray - Reviewed and interpreted by me.  Healing well fractures of the fourth and fifth proximal phalanx.  Good alignment.

## 2021-12-17 NOTE — ASSESSMENT & PLAN NOTE
Both toes healing well with expected healing in the interval.  Continue current work restrictions.  Of note should be able to return to full unrestricted at the end of current restrictions.  If work does need a note he will contact clinic and will be provided.

## 2022-02-06 ENCOUNTER — HEALTH MAINTENANCE LETTER (OUTPATIENT)
Age: 37
End: 2022-02-06

## 2022-09-27 ENCOUNTER — OFFICE VISIT (OUTPATIENT)
Dept: INTERNAL MEDICINE | Facility: CLINIC | Age: 37
End: 2022-09-27
Payer: COMMERCIAL

## 2022-09-27 VITALS
OXYGEN SATURATION: 97 % | HEIGHT: 68 IN | SYSTOLIC BLOOD PRESSURE: 150 MMHG | DIASTOLIC BLOOD PRESSURE: 87 MMHG | BODY MASS INDEX: 33.59 KG/M2 | WEIGHT: 221.6 LBS | TEMPERATURE: 97.5 F | HEART RATE: 79 BPM

## 2022-09-27 DIAGNOSIS — F41.9 ANXIETY: ICD-10-CM

## 2022-09-27 DIAGNOSIS — E89.0 POSTABLATIVE HYPOTHYROIDISM: Primary | ICD-10-CM

## 2022-09-27 DIAGNOSIS — R03.0 ELEVATED BLOOD PRESSURE READING WITHOUT DIAGNOSIS OF HYPERTENSION: ICD-10-CM

## 2022-09-27 LAB
ALBUMIN SERPL BCG-MCNC: 4.9 G/DL (ref 3.5–5.2)
ALP SERPL-CCNC: 74 U/L (ref 40–129)
ALT SERPL W P-5'-P-CCNC: 31 U/L (ref 10–50)
ANION GAP SERPL CALCULATED.3IONS-SCNC: 9 MMOL/L (ref 7–15)
AST SERPL W P-5'-P-CCNC: 29 U/L (ref 10–50)
BILIRUB DIRECT SERPL-MCNC: <0.2 MG/DL (ref 0–0.3)
BILIRUB SERPL-MCNC: 0.4 MG/DL
BUN SERPL-MCNC: 15.1 MG/DL (ref 6–20)
CALCIUM SERPL-MCNC: 9.2 MG/DL (ref 8.6–10)
CHLORIDE SERPL-SCNC: 102 MMOL/L (ref 98–107)
CHOLEST SERPL-MCNC: 186 MG/DL
CREAT SERPL-MCNC: 0.82 MG/DL (ref 0.67–1.17)
DEPRECATED HCO3 PLAS-SCNC: 29 MMOL/L (ref 22–29)
GFR SERPL CREATININE-BSD FRML MDRD: >90 ML/MIN/1.73M2
GLUCOSE SERPL-MCNC: 109 MG/DL (ref 70–99)
HDLC SERPL-MCNC: 81 MG/DL
LDLC SERPL CALC-MCNC: 94 MG/DL
NONHDLC SERPL-MCNC: 105 MG/DL
POTASSIUM SERPL-SCNC: 4.5 MMOL/L (ref 3.4–5.3)
PROT SERPL-MCNC: 7.4 G/DL (ref 6.4–8.3)
SODIUM SERPL-SCNC: 140 MMOL/L (ref 136–145)
TRIGL SERPL-MCNC: 57 MG/DL
TSH SERPL DL<=0.005 MIU/L-ACNC: 1.57 UIU/ML (ref 0.3–4.2)

## 2022-09-27 PROCEDURE — 82248 BILIRUBIN DIRECT: CPT | Performed by: NURSE PRACTITIONER

## 2022-09-27 PROCEDURE — 84443 ASSAY THYROID STIM HORMONE: CPT | Performed by: NURSE PRACTITIONER

## 2022-09-27 PROCEDURE — 99214 OFFICE O/P EST MOD 30 MIN: CPT | Mod: 25 | Performed by: NURSE PRACTITIONER

## 2022-09-27 PROCEDURE — 90471 IMMUNIZATION ADMIN: CPT | Performed by: NURSE PRACTITIONER

## 2022-09-27 PROCEDURE — 80053 COMPREHEN METABOLIC PANEL: CPT | Performed by: NURSE PRACTITIONER

## 2022-09-27 PROCEDURE — 36415 COLL VENOUS BLD VENIPUNCTURE: CPT | Performed by: NURSE PRACTITIONER

## 2022-09-27 PROCEDURE — 90715 TDAP VACCINE 7 YRS/> IM: CPT | Performed by: NURSE PRACTITIONER

## 2022-09-27 PROCEDURE — 80061 LIPID PANEL: CPT | Performed by: NURSE PRACTITIONER

## 2022-09-27 PROCEDURE — 99395 PREV VISIT EST AGE 18-39: CPT | Mod: 25 | Performed by: NURSE PRACTITIONER

## 2022-09-27 ASSESSMENT — ANXIETY QUESTIONNAIRES
6. BECOMING EASILY ANNOYED OR IRRITABLE: NOT AT ALL
3. WORRYING TOO MUCH ABOUT DIFFERENT THINGS: SEVERAL DAYS
7. FEELING AFRAID AS IF SOMETHING AWFUL MIGHT HAPPEN: NOT AT ALL
8. IF YOU CHECKED OFF ANY PROBLEMS, HOW DIFFICULT HAVE THESE MADE IT FOR YOU TO DO YOUR WORK, TAKE CARE OF THINGS AT HOME, OR GET ALONG WITH OTHER PEOPLE?: NOT DIFFICULT AT ALL
IF YOU CHECKED OFF ANY PROBLEMS ON THIS QUESTIONNAIRE, HOW DIFFICULT HAVE THESE PROBLEMS MADE IT FOR YOU TO DO YOUR WORK, TAKE CARE OF THINGS AT HOME, OR GET ALONG WITH OTHER PEOPLE: NOT DIFFICULT AT ALL
GAD7 TOTAL SCORE: 4
1. FEELING NERVOUS, ANXIOUS, OR ON EDGE: SEVERAL DAYS
4. TROUBLE RELAXING: SEVERAL DAYS
5. BEING SO RESTLESS THAT IT IS HARD TO SIT STILL: NOT AT ALL
2. NOT BEING ABLE TO STOP OR CONTROL WORRYING: SEVERAL DAYS
GAD7 TOTAL SCORE: 4
GAD7 TOTAL SCORE: 4
7. FEELING AFRAID AS IF SOMETHING AWFUL MIGHT HAPPEN: NOT AT ALL

## 2022-09-27 ASSESSMENT — PAIN SCALES - GENERAL: PAINLEVEL: NO PAIN (0)

## 2022-09-27 NOTE — PROGRESS NOTES
SUBJECTIVE:   CC: Jordan is an 37 year old who presents for preventative health visit.     He has been experiencing more stress and anxiety. His kids are getting older with more sports, he had a bathroom remodel project that was a lot of stress. He does not have panic attacks. He does notice more symptoms after he has caffeine in the mornings. He estimates that he gets 6-7 hours of sleep per night and he has to be up very early for work (2:30AM). He drinks 3-4 beers per day.      His mom  at age 29 from liver cancer, he was age 11. He thinks that she had hepatitis C potentially.     Patient has been advised of split billing requirements and indicates understanding: Yes     History of Present Illness       Mental Health Follow-up:  Patient presents to follow-up on Anxiety.    Patient's anxiety since last visit has been:  Medium  The patient is not having other symptoms associated with anxiety.  Any significant life events: No  Patient is feeling anxious or having panic attacks.  Patient has no concerns about alcohol or drug use.    Hypothyroidism:     Since last visit, patient describes the following symptoms::  Anxiety    He eats 2-3 servings of fruits and vegetables daily.He consumes 1 sweetened beverage(s) daily.He exercises with enough effort to increase his heart rate 60 or more minutes per day.  He exercises with enough effort to increase his heart rate 5 days per week.   He is taking medications regularly.  Today's JONATHAN-7 Score: 4        Today's PHQ-2 Score:   PHQ-2 (  [employer]) 2022   Q1: Little interest or pleasure in doing things 1   Q2: Feeling down, depressed or hopeless 0   PHQ-2 Score 1   PHQ-2 Total Score (12-17 Years)- Positive if 3 or more points; Administer PHQ-A if positive -   Q1: Little interest or pleasure in doing things Several days   Q2: Feeling down, depressed or hopeless Several days   PHQ-2 Score 2       Abuse: Current or Past(Physical, Sexual or Emotional)- No  Do you feel safe in  "your environment? Yes      Social History     Tobacco Use     Smoking status: Former Smoker     Packs/day: 0.25     Years: 10.00     Pack years: 2.50     Quit date: 2017     Years since quittin.7     Smokeless tobacco: Never Used   Substance Use Topics     Alcohol use: Yes     Comment: 3-4 beers a week     If you drink alcohol do you typically have >3 drinks per day or >7 drinks per week? No    Alcohol Use 2022   Prescreen: >3 drinks/day or >7 drinks/week? No       Last PSA: No results found for: PSA    Reviewed orders with patient. Reviewed health maintenance and updated orders accordingly - Yes      Reviewed and updated as needed this visit by clinical staff   Tobacco  Allergies  Meds  Problems  Med Hx  Surg Hx  Fam Hx  Soc   Hx          Reviewed and updated as needed this visit by Provider     Meds  Problems    Fam Hx                   OBJECTIVE:   BP (!) 150/87 (BP Location: Right arm, Patient Position: Left side, Cuff Size: Adult Regular)   Pulse 79   Temp 97.5  F (36.4  C) (Oral)   Ht 1.727 m (5' 8\")   Wt 100.5 kg (221 lb 9.6 oz)   SpO2 97%   BMI 33.69 kg/m      Physical Exam  GENERAL: healthy, alert and no distress  EYES: Eyes grossly normal to inspection, PERRL and conjunctivae and sclerae normal  HENT: ear canals and TM's normal, nose and mouth without ulcers or lesions  NECK: no adenopathy, no asymmetry, masses, or scars and thyroid normal to palpation  RESP: lungs clear to auscultation - no rales, rhonchi or wheezes  CV: regular rate and rhythm, normal S1 S2, no S3 or S4, no murmur, click or rub, no peripheral edema and peripheral pulses strong  ABDOMEN: soft, nontender, no hepatosplenomegaly, no masses and bowel sounds normal  MS: no gross musculoskeletal defects noted, no edema  SKIN: no suspicious lesions or rashes  NEURO: Normal strength and tone, mentation intact and speech normal  PSYCH: mentation appears normal, affect normal/bright        ASSESSMENT/PLAN:     Problem " "List Items Addressed This Visit        Endocrine    Postablative hypothyroidism - Primary     Euthyroid.  Continue levothyroxine.           Relevant Medications    levothyroxine (SYNTHROID/LEVOTHROID) 175 MCG tablet    Other Relevant Orders    TSH with free T4 reflex (Completed)       Other    Anxiety     May be slightly worse related to alcohol use.  He is encouraged to cut back to no more than 2 drinks per day.  Sleep hygiene was reviewed.  Offered referral to counseling, he declines at this time.           Elevated blood pressure reading without diagnosis of hypertension     Consider propranolol for anxiety and blood pressure if elevated with recheck in 2-3 weeks            Relevant Orders    Hepatic panel (Albumin, ALT, AST, Bili, Alk Phos, TP) (Completed)    Basic metabolic panel (Completed)    Lipid panel reflex to direct LDL Fasting (Completed)          Estimated body mass index is 33.69 kg/m  as calculated from the following:    Height as of this encounter: 1.727 m (5' 8\").    Weight as of this encounter: 100.5 kg (221 lb 9.6 oz).       He reports that he quit smoking about 5 years ago. He has a 2.50 pack-year smoking history. He has never used smokeless tobacco.      Counseling Resources:  ATP IV Guidelines  Pooled Cohorts Equation Calculator  FRAX Risk Assessment  ICSI Preventive Guidelines  Dietary Guidelines for Americans, 2010  USDA's MyPlate  ASA Prophylaxis  Lung CA Screening    Jaclyn López NP  [facility]  "

## 2022-10-03 RX ORDER — LEVOTHYROXINE SODIUM 175 UG/1
175 TABLET ORAL DAILY
Qty: 90 TABLET | Refills: 3 | Status: SHIPPED | OUTPATIENT
Start: 2022-10-03 | End: 2023-11-16

## 2022-10-03 NOTE — ASSESSMENT & PLAN NOTE
May be slightly worse related to alcohol use.  He is encouraged to cut back to no more than 2 drinks per day.  Sleep hygiene was reviewed.  Offered referral to counseling, he declines at this time.

## 2022-10-11 ENCOUNTER — ALLIED HEALTH/NURSE VISIT (OUTPATIENT)
Dept: FAMILY MEDICINE | Facility: CLINIC | Age: 37
End: 2022-10-11
Payer: COMMERCIAL

## 2022-10-11 VITALS — SYSTOLIC BLOOD PRESSURE: 131 MMHG | DIASTOLIC BLOOD PRESSURE: 79 MMHG

## 2022-10-11 DIAGNOSIS — Z01.30 BLOOD PRESSURE CHECK: Primary | ICD-10-CM

## 2022-10-11 PROCEDURE — 99207 PR NO CHARGE NURSE ONLY: CPT

## 2022-10-11 NOTE — PROGRESS NOTES
I met with Gustavo Becerra at the request of Jaclyn López to recheck his blood pressure.  Blood pressure medications on the med list were reviewed with patient.    Patient has taken all medications as per usual regimen: Yes  Patient reports tolerating them without any issues or concerns: Yes    Vitals:    10/11/22 0935   BP: 131/79   BP Location: Right arm   Patient Position: Sitting   Cuff Size: Adult Regular       Blood pressure was taken, previous encounter was reviewed, recorded blood pressure below 140/90.  Patient was discharged and the note will be sent to the provider for final review.     Patient reports his BP yesterday 10/10 was 150's/90's.

## 2023-05-14 ENCOUNTER — HEALTH MAINTENANCE LETTER (OUTPATIENT)
Age: 38
End: 2023-05-14

## 2023-09-27 ENCOUNTER — OFFICE VISIT (OUTPATIENT)
Dept: INTERNAL MEDICINE | Facility: CLINIC | Age: 38
End: 2023-09-27
Payer: COMMERCIAL

## 2023-09-27 VITALS
OXYGEN SATURATION: 100 % | WEIGHT: 223 LBS | BODY MASS INDEX: 33.91 KG/M2 | SYSTOLIC BLOOD PRESSURE: 140 MMHG | DIASTOLIC BLOOD PRESSURE: 80 MMHG | RESPIRATION RATE: 16 BRPM | HEART RATE: 57 BPM

## 2023-09-27 DIAGNOSIS — E66.01 MORBID OBESITY (H): ICD-10-CM

## 2023-09-27 DIAGNOSIS — E89.0 POSTABLATIVE HYPOTHYROIDISM: Primary | ICD-10-CM

## 2023-09-27 DIAGNOSIS — I10 PRIMARY HYPERTENSION: ICD-10-CM

## 2023-09-27 DIAGNOSIS — F41.9 ANXIETY: ICD-10-CM

## 2023-09-27 PROBLEM — S92.512D CLOSED DISPLACED FRACTURE OF PROXIMAL PHALANX OF LESSER TOE OF LEFT FOOT WITH ROUTINE HEALING: Status: RESOLVED | Noted: 2021-12-08 | Resolved: 2023-09-27

## 2023-09-27 LAB
T4 FREE SERPL-MCNC: 1.92 NG/DL (ref 0.9–1.7)
TSH SERPL DL<=0.005 MIU/L-ACNC: 0.12 UIU/ML (ref 0.3–4.2)

## 2023-09-27 PROCEDURE — 84443 ASSAY THYROID STIM HORMONE: CPT | Performed by: NURSE PRACTITIONER

## 2023-09-27 PROCEDURE — 84439 ASSAY OF FREE THYROXINE: CPT | Performed by: NURSE PRACTITIONER

## 2023-09-27 PROCEDURE — 99214 OFFICE O/P EST MOD 30 MIN: CPT | Performed by: NURSE PRACTITIONER

## 2023-09-27 PROCEDURE — 36415 COLL VENOUS BLD VENIPUNCTURE: CPT | Performed by: NURSE PRACTITIONER

## 2023-09-27 RX ORDER — LEVOTHYROXINE SODIUM 150 UG/1
150 TABLET ORAL DAILY
Qty: 90 TABLET | Refills: 0 | Status: SHIPPED | OUTPATIENT
Start: 2023-09-27 | End: 2023-11-16

## 2023-09-27 RX ORDER — LISINOPRIL 2.5 MG/1
2.5 TABLET ORAL DAILY
Qty: 90 TABLET | Refills: 1 | Status: SHIPPED | OUTPATIENT
Start: 2023-09-27

## 2023-09-27 RX ORDER — LEVOTHYROXINE SODIUM 175 UG/1
175 TABLET ORAL DAILY
Qty: 90 TABLET | Refills: 3 | Status: CANCELLED | OUTPATIENT
Start: 2023-09-27

## 2023-09-27 ASSESSMENT — PAIN SCALES - GENERAL: PAINLEVEL: NO PAIN (0)

## 2023-09-27 NOTE — ASSESSMENT & PLAN NOTE
New diagnosis of hypertension was reviewed   START: lisinopril 2.5 mg daily. He will send a [employer] message update of his readings in about 2-3 weeks.

## 2023-09-27 NOTE — ASSESSMENT & PLAN NOTE
Hyperthyroid/over replaced on recent labs through work. Repeat TSH today again demonstrates over replacement with levothyroxine.  We will plan to decrease the dose of levothyroxine to 150 mcg daily (currently 175 mcg).  Repeat TSH in 6 to 8 weeks.

## 2023-09-27 NOTE — ASSESSMENT & PLAN NOTE
Improved/resolved since he eliminated alcohol. He is commended for making this change which likely benefits his overall health.

## 2023-09-27 NOTE — LETTER
October 3, 2023      Jordan Becerra  [address] [place] [address]        Dear ,    We are writing to inform you of your test results.    Your TSH indicates that you are getting too much of the levothyroxine medication.  I have reduced the dose to 150 mcg daily and sent this to your pharmacy.  I would like to recheck your thyroid level in 6 to 8 weeks to assure that this has normalized.  Please schedule a lab appointment for around the middle of November.     Resulted Orders   TSH WITH FREE T4 REFLEX   Result Value Ref Range    TSH 0.12 (L) 0.30 - 4.20 uIU/mL   T4 free   Result Value Ref Range    Free T4 1.92 (H) 0.90 - 1.70 ng/dL       If you have any questions or concerns, please call the clinic at the number listed above.       Sincerely,      Jaclyn López NP

## 2023-09-27 NOTE — ASSESSMENT & PLAN NOTE
New weight related diagnosis of HTN. Encouraged weight loss of about 10% of his current weight which could improve blood pressure and overall health. He is already physically active.

## 2023-09-27 NOTE — PROGRESS NOTES
"  Assessment & Plan   Problem List Items Addressed This Visit          Digestive    Morbid obesity (H)     New weight related diagnosis of HTN. Encouraged weight loss of about 10% of his current weight which could improve blood pressure and overall health. He is already physically active.             Endocrine    Postablative hypothyroidism - Primary     Hyperthyroid/over replaced on recent labs through work. Repeat TSH today again demonstrates over replacement with levothyroxine.  We will plan to decrease the dose of levothyroxine to 150 mcg daily (currently 175 mcg).  Repeat TSH in 6 to 8 weeks.         Relevant Medications    levothyroxine (SYNTHROID/LEVOTHROID) 150 MCG tablet    Other Relevant Orders    TSH WITH FREE T4 REFLEX (Completed)    T4 free (Completed)    TSH with free T4 reflex       Circulatory    Primary hypertension     New diagnosis of hypertension was reviewed   START: lisinopril 2.5 mg daily. He will send a [employer] message update of his readings in about 2-3 weeks.          Relevant Medications    lisinopril (ZESTRIL) 2.5 MG tablet       Other    Anxiety     Improved/resolved since he eliminated alcohol. He is commended for making this change which likely benefits his overall health.                 BMI:   Estimated body mass index is 33.91 kg/m  as calculated from the following:    Height as of 9/27/22: 1.727 m (5' 8\").    Weight as of this encounter: 101.2 kg (223 lb).       Jaclyn López NP  [facility]    Vamshi Ortega is a 38 year old, presenting for the following health issues:  Follow Up (Thyroid check )        9/27/2023     9:07 AM   Additional Questions   Roomed by Ricki LEONG CMA       History of Present Illness       Hypothyroidism:     Since last visit, patient describes the following symptoms::  Fatigue and Weight gain    Weight gain::  5 lbs.    He eats 2-3 servings of fruits and vegetables daily.He consumes 1 sweetened beverage(s) daily.He exercises with " enough effort to increase his heart rate 60 or more minutes per day.  He exercises with enough effort to increase his heart rate 5 days per week.   He is taking medications regularly.     He had labs done about 1.5 months ago. His TSH was low at 0.305. No changes in the way that he takes the medication. He might be a bit more fatigued.     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     Weight- He has tried to cut back on portions. He works for [employer] doing a physically active job.     Alcohol use- he stopped drinking altogether        Objective    BP (!) 140/80 (BP Location: Right arm, Patient Position: Sitting, Cuff Size: Adult Regular)   Pulse 57   Resp 16   Wt 101.2 kg (223 lb)   SpO2 100%   BMI 33.91 kg/m    Body mass index is 33.91 kg/m .    Wt Readings from Last 5 Encounters:   09/27/23 101.2 kg (223 lb)   09/27/22 100.5 kg (221 lb 9.6 oz)   12/17/21 104.1 kg (229 lb 6.4 oz)   12/08/21 103 kg (227 lb)   12/01/21 101.2 kg (223 lb)     Physical Exam   GENERAL: Alert and no distress  RESP: lungs clear to auscultation - no rales, rhonchi or wheezes  CV: regular rate and rhythm, normal S1 S2. No murmur

## 2023-10-03 ENCOUNTER — TELEPHONE (OUTPATIENT)
Dept: INTERNAL MEDICINE | Facility: CLINIC | Age: 38
End: 2023-10-03
Payer: COMMERCIAL

## 2023-10-03 DIAGNOSIS — E89.0 POSTABLATIVE HYPOTHYROIDISM: ICD-10-CM

## 2023-10-03 RX ORDER — LEVOTHYROXINE SODIUM 175 UG/1
175 TABLET ORAL DAILY
Qty: 90 TABLET | Refills: 3 | OUTPATIENT
Start: 2023-10-03

## 2023-10-03 NOTE — TELEPHONE ENCOUNTER
Jaclyn López NP  [facility] Internal Medicine Support Pool  Please call pt with [employer] result note x 2. If no answer, send result note letter.    Your TSH indicates that you are getting too much of the levothyroxine medication.  I have reduced the dose to 150 mcg daily and sent this to your pharmacy.  I would like to recheck your thyroid level in 6 to 8 weeks to assure that this has normalized.  Please schedule a lab appointment for around the middle of November.

## 2023-11-16 ENCOUNTER — LAB (OUTPATIENT)
Dept: LAB | Facility: CLINIC | Age: 38
End: 2023-11-16
Payer: COMMERCIAL

## 2023-11-16 DIAGNOSIS — E89.0 POSTABLATIVE HYPOTHYROIDISM: ICD-10-CM

## 2023-11-16 LAB — TSH SERPL DL<=0.005 MIU/L-ACNC: 0.57 UIU/ML (ref 0.3–4.2)

## 2023-11-16 PROCEDURE — 84443 ASSAY THYROID STIM HORMONE: CPT

## 2023-11-16 PROCEDURE — 36415 COLL VENOUS BLD VENIPUNCTURE: CPT

## 2023-11-16 RX ORDER — LEVOTHYROXINE SODIUM 150 UG/1
150 TABLET ORAL DAILY
Qty: 90 TABLET | Refills: 3 | Status: SHIPPED | OUTPATIENT
Start: 2023-11-16

## 2024-04-08 ENCOUNTER — TELEPHONE (OUTPATIENT)
Dept: INTERNAL MEDICINE | Facility: CLINIC | Age: 39
End: 2024-04-08
Payer: COMMERCIAL

## 2024-04-08 NOTE — TELEPHONE ENCOUNTER
"Patient is scheduled for 04/17 with PCP    Appt note states \"possible tear on shoulder\"    Can we triage in case patient needs to be seen sooner?    Thank you!  "

## 2024-04-11 NOTE — TELEPHONE ENCOUNTER
Mailbox is full and unable to accept messages at this time.     This was the 3rd attempt to reach patient with no answer. Closing encounter

## 2024-07-21 ENCOUNTER — HEALTH MAINTENANCE LETTER (OUTPATIENT)
Age: 39
End: 2024-07-21

## 2024-10-08 ENCOUNTER — VIRTUAL VISIT (OUTPATIENT)
Dept: INTERNAL MEDICINE | Facility: CLINIC | Age: 39
End: 2024-10-08
Payer: COMMERCIAL

## 2024-10-08 DIAGNOSIS — I10 PRIMARY HYPERTENSION: Primary | ICD-10-CM

## 2024-10-08 DIAGNOSIS — E89.0 POSTABLATIVE HYPOTHYROIDISM: ICD-10-CM

## 2024-10-08 DIAGNOSIS — E66.01 MORBID OBESITY (H): ICD-10-CM

## 2024-10-08 PROCEDURE — 99214 OFFICE O/P EST MOD 30 MIN: CPT | Mod: 95 | Performed by: NURSE PRACTITIONER

## 2024-10-08 PROCEDURE — G2211 COMPLEX E/M VISIT ADD ON: HCPCS | Mod: 95 | Performed by: NURSE PRACTITIONER

## 2024-10-08 NOTE — PROGRESS NOTES
Jordan is a 39 year old who is being evaluated via a billable video visit.    How would you like to obtain your AVS? MyChart  If the video visit is dropped, the invitation should be resent by: Text to cell phone: [phone]  Will anyone else be joining your video visit? No      Assessment & Plan   Problem List Items Addressed This Visit       Postablative hypothyroidism     Recent physical through his work and he was told that his thyroid lab work was just a bit off.  He is not sure of the number.  He feels well.  -Repeat TSH  -Adjust levothyroxine as needed         Relevant Orders    TSH with free T4 reflex    Morbid obesity (H)     He has a weight related diagnosis of hypertension.  He had lost weight with intentional measures particularly dietary but has regained some of the weight.  He is encouraged to work on caloric reduction and regular physical activity         Primary hypertension - Primary     His last blood pressure was elevated in the office and it sounds like his blood pressure readings at home have been mildly elevated, in particular the diastolic reading.  He had a dry cough when he took lisinopril and he did not like how he felt so he stopped the medication.  We discussed that if his blood pressure remains consistently above 130/80 we should consider an alternative medication.  He is going to continue to check his blood pressure readings at home and follow-up in the office if readings are above this goal.  -Consider losartan         Relevant Orders    BASIC METABOLIC PANEL      Return in about 4 weeks (around 11/5/2024) for Follow-up in office for physical.          Subjective   Jordan is a 39 year old, presenting for the following health issues:  Follow Up, Thyroid Problem, and Hypertension      History of Present Illness       Hypertension: He presents for follow up of hypertension.  He does check blood pressure  regularly outside of the clinic. Outside blood pressures have been over 140/90. He follows  "a low salt diet.     Reason for visit:  Thyroid check-upHe consumes 2 sweetened beverage(s) daily. He exercises with enough effort to increase his heart rate 4 days per week. He is missing 7 dose(s) of medications per week.     HTN- His wife has been taking his blood pressure and readings have been around 120/90. When he took lisinopril he had a dry cough and he didn't like how he felt on it, \"it slowed me down\".    He had a recent work shoulder injury and was referred by his work to a shoulder specialist.     Weight- he had been losing a little weight with intentional measures, particularly related to his diet.  He admits he has regained some of the weight.  He is started a new position that is more physically active but just had a shoulder injury so he has been unable to perform the duties of his job.    Hypothyroidism- He has been taking the levothyroxine medication regularly. He just had a physical through his work at [facility].           Objective       Wt Readings from Last 5 Encounters:   09/27/23 101.2 kg (223 lb)   09/27/22 100.5 kg (221 lb 9.6 oz)   12/17/21 104.1 kg (229 lb 6.4 oz)   12/08/21 103 kg (227 lb)   12/01/21 101.2 kg (223 lb)       Vitals:  No vitals were obtained today due to virtual visit.    Physical Exam   GENERAL: alert and no distress  EYES: Eyes grossly normal to inspection.  No discharge or erythema, or obvious scleral/conjunctival abnormalities.  RESP: No audible wheeze, cough, or visible cyanosis.    SKIN: Visible skin clear. No significant rash, abnormal pigmentation or lesions.  NEURO: Cranial nerves grossly intact.  Mentation and speech appropriate for age.  PSYCH: Appropriate affect, tone, and pace of words          Video-Visit Details    Type of service:  Video Visit   Originating Location (pt. Location): Other work  Distant Location (provider location):  On-site  Platform used for Video Visit: Tim  The longitudinal plan of care for the diagnosis(es)/condition(s) as documented " were addressed during this visit. Due to the added complexity in care, I will continue to support Jordan in the subsequent management and with ongoing continuity of care.  Signed Electronically by: Jaclyn López NP

## 2024-10-11 NOTE — ASSESSMENT & PLAN NOTE
Recent physical through his work and he was told that his thyroid lab work was just a bit off.  He is not sure of the number.  He feels well.  -Repeat TSH  -Adjust levothyroxine as needed

## 2024-10-11 NOTE — ASSESSMENT & PLAN NOTE
His last blood pressure was elevated in the office and it sounds like his blood pressure readings at home have been mildly elevated, in particular the diastolic reading.  He had a dry cough when he took lisinopril and he did not like how he felt so he stopped the medication.  We discussed that if his blood pressure remains consistently above 130/80 we should consider an alternative medication.  He is going to continue to check his blood pressure readings at home and follow-up in the office if readings are above this goal.  -Consider losartan

## 2024-10-11 NOTE — ASSESSMENT & PLAN NOTE
He has a weight related diagnosis of hypertension.  He had lost weight with intentional measures particularly dietary but has regained some of the weight.  He is encouraged to work on caloric reduction and regular physical activity

## 2024-10-12 ENCOUNTER — LAB (OUTPATIENT)
Dept: LAB | Facility: CLINIC | Age: 39
End: 2024-10-12
Payer: COMMERCIAL

## 2024-10-12 DIAGNOSIS — E89.0 POSTABLATIVE HYPOTHYROIDISM: ICD-10-CM

## 2024-10-12 DIAGNOSIS — I10 PRIMARY HYPERTENSION: ICD-10-CM

## 2024-10-12 LAB
ANION GAP SERPL CALCULATED.3IONS-SCNC: 14 MMOL/L (ref 7–15)
BUN SERPL-MCNC: 11 MG/DL (ref 6–20)
CALCIUM SERPL-MCNC: 9 MG/DL (ref 8.8–10.4)
CHLORIDE SERPL-SCNC: 102 MMOL/L (ref 98–107)
CREAT SERPL-MCNC: 1.11 MG/DL (ref 0.67–1.17)
EGFRCR SERPLBLD CKD-EPI 2021: 87 ML/MIN/1.73M2
GLUCOSE SERPL-MCNC: 96 MG/DL (ref 70–99)
HCO3 SERPL-SCNC: 24 MMOL/L (ref 22–29)
POTASSIUM SERPL-SCNC: 4 MMOL/L (ref 3.4–5.3)
SODIUM SERPL-SCNC: 140 MMOL/L (ref 135–145)
T4 FREE SERPL-MCNC: 1.25 NG/DL (ref 0.9–1.7)
TSH SERPL DL<=0.005 MIU/L-ACNC: 13.9 UIU/ML (ref 0.3–4.2)

## 2024-10-12 PROCEDURE — 84439 ASSAY OF FREE THYROXINE: CPT

## 2024-10-12 PROCEDURE — 80048 BASIC METABOLIC PNL TOTAL CA: CPT

## 2024-10-12 PROCEDURE — 84443 ASSAY THYROID STIM HORMONE: CPT

## 2024-10-12 PROCEDURE — 36415 COLL VENOUS BLD VENIPUNCTURE: CPT

## 2024-10-23 ENCOUNTER — OFFICE VISIT (OUTPATIENT)
Dept: INTERNAL MEDICINE | Facility: CLINIC | Age: 39
End: 2024-10-23
Payer: OTHER MISCELLANEOUS

## 2024-10-23 VITALS
HEIGHT: 68 IN | SYSTOLIC BLOOD PRESSURE: 154 MMHG | DIASTOLIC BLOOD PRESSURE: 107 MMHG | BODY MASS INDEX: 37.75 KG/M2 | TEMPERATURE: 98.1 F | RESPIRATION RATE: 16 BRPM | WEIGHT: 249.1 LBS | OXYGEN SATURATION: 98 % | HEART RATE: 93 BPM

## 2024-10-23 DIAGNOSIS — Z01.818 PREOPERATIVE EXAMINATION: Primary | ICD-10-CM

## 2024-10-23 DIAGNOSIS — M67.921 DISORDER OF TENDON OF RIGHT BICEPS: ICD-10-CM

## 2024-10-23 DIAGNOSIS — E89.0 POSTABLATIVE HYPOTHYROIDISM: ICD-10-CM

## 2024-10-23 DIAGNOSIS — R06.83 SNORING: ICD-10-CM

## 2024-10-23 DIAGNOSIS — I10 PRIMARY HYPERTENSION: ICD-10-CM

## 2024-10-23 LAB
ATRIAL RATE - MUSE: 88 BPM
DIASTOLIC BLOOD PRESSURE - MUSE: NORMAL MMHG
INTERPRETATION ECG - MUSE: NORMAL
P AXIS - MUSE: 68 DEGREES
PR INTERVAL - MUSE: 164 MS
QRS DURATION - MUSE: 86 MS
QT - MUSE: 358 MS
QTC - MUSE: 433 MS
R AXIS - MUSE: 31 DEGREES
SYSTOLIC BLOOD PRESSURE - MUSE: NORMAL MMHG
T AXIS - MUSE: 47 DEGREES
VENTRICULAR RATE- MUSE: 88 BPM

## 2024-10-23 PROCEDURE — G2211 COMPLEX E/M VISIT ADD ON: HCPCS | Performed by: NURSE PRACTITIONER

## 2024-10-23 PROCEDURE — 93005 ELECTROCARDIOGRAM TRACING: CPT | Performed by: NURSE PRACTITIONER

## 2024-10-23 PROCEDURE — 93010 ELECTROCARDIOGRAM REPORT: CPT | Performed by: INTERNAL MEDICINE

## 2024-10-23 PROCEDURE — 99214 OFFICE O/P EST MOD 30 MIN: CPT | Performed by: NURSE PRACTITIONER

## 2024-10-23 RX ORDER — AMLODIPINE BESYLATE 5 MG/1
5 TABLET ORAL DAILY
Qty: 90 TABLET | Refills: 0 | Status: SHIPPED | OUTPATIENT
Start: 2024-10-23 | End: 2024-10-30 | Stop reason: SINTOL

## 2024-10-23 ASSESSMENT — PAIN SCALES - GENERAL: PAINLEVEL_OUTOF10: MODERATE PAIN (5)

## 2024-10-23 NOTE — PROGRESS NOTES
Preoperative Evaluation  [address] [address]  Phone: [phone]  Fax: [phone]  Primary Provider: Jaclyn López NP  Pre-op Performing Provider: Jaclyn López NP  Oct 23, 2024           10/23/2024   Surgical Information   What procedure is being done? Bicep tear surgery    Facility or Hospital where procedure/surgery will be performed: o [facility] [place]    Who is doing the procedure / surgery? Dr. Gutierrez    Date of surgery / procedure: 10/25/24    Time of surgery / procedure: Morning    Where do you plan to recover after surgery? at home with Home Care        Patient-reported     Fax number for surgical facility: [phone]    Assessment & Plan     The proposed surgical procedure is considered INTERMEDIATE risk.    Problem List Items Addressed This Visit       Postablative hypothyroidism     Euthyroid with normal free T4 level         Primary hypertension     Blood pressure needs to be better controlled in order to proceed with surgery.  - Start amlodipine 5 mg daily  - Check ACR at next office visit  -Follow-up blood pressure check and physical in 1 month          Relevant Medications    amLODIPine (NORVASC) 5 MG tablet     Other Visit Diagnoses       Preoperative examination    -  Primary    Relevant Orders    EKG 12-lead, tracing only (Completed)    Disorder of tendon of right biceps        Snoring        Relevant Orders    Adult Sleep Eval & Management Referral              Possible Sleep Apnea: Will eventually need evaluation for symptoms concerning for sleep apnea        Risks and Recommendations  The patient has the following additional risks and recommendations for perioperative complications:   - No identified additional risk factors other than previously addressed    Antiplatelet or Anticoagulation Medication Instructions   - Patient is on no antiplatelet or anticoagulation medications.    Additional Medication  Instructions  Take all scheduled medications on the day of surgery    Recommendation  Approval given to proceed with proposed procedure, without further diagnostic evaluation.    Vamshi Ortega is a 39 year old, presenting for the following:  Pre-Op Exam (Bicep Tear)          10/23/2024     1:39 PM   Additional Questions   Roomed by MIRYAM Duran   Accompanied by RAISA HOFFMAN related to upcoming procedure: Gustavo Becerra is here for preoperative evaluation.  He was found to have a biceps tendon tear after an injury at work and is scheduled to undergo repair.    He has a history of hypertension which is not currently treated.          10/23/2024   Pre-Op Questionnaire   Have you ever had a heart attack or stroke? No    Have you ever had surgery on your heart or blood vessels, such as a stent placement, a coronary artery bypass, or surgery on an artery in your head, neck, heart, or legs? No    Do you have chest pain with activity? No    Do you have a history of heart failure? No    Do you currently have a cold, bronchitis or symptoms of other infection? No    Do you have a cough, shortness of breath, or wheezing? No    Do you or anyone in your family have previous history of blood clots? No    Do you or does anyone in your family have a serious bleeding problem such as prolonged bleeding following surgeries or cuts? No    Have you ever had problems with anemia or been told to take iron pills? No    Have you had any abnormal blood loss such as black, tarry or bloody stools? No    Have you ever had a blood transfusion? No    Are you willing to have a blood transfusion if it is medically needed before, during, or after your surgery? Yes    Have you or any of your relatives ever had problems with anesthesia? No    Do you have sleep apnea, excessive snoring or daytime drowsiness? (!) YES - wife has noticed snoring and apneic episodes    Do you have a CPAP machine? (!) NO     Do you have any artifical heart valves or  other implanted medical devices like a pacemaker, defibrillator, or continuous glucose monitor? No    Do you have artificial joints? No    Are you allergic to latex? No         Health Care Directive  Patient does not have a Health Care Directive: Discussed advance care planning with patient; however, patient declined at this time.    Preoperative Review of    reviewed - no record of controlled substances prescribed.      Patient Active Problem List    Diagnosis Date Noted    Anxiety 2022     Priority: Medium    Primary hypertension 2022     Priority: Medium     Lisinopril-dry cough      Morbid obesity (H) 2018     Priority: Medium    Postablative hypothyroidism      Priority: Medium    History of nicotine use      Priority: Medium    Graves disease - treated with BASSETT. 2014     Priority: Medium      Past Medical History:   Diagnosis Date    Closed displaced fracture of proximal phalanx of lesser toe of left foot with routine healing 2021    Disease of thyroid gland      Past Surgical History:   Procedure Laterality Date    ADENOIDECTOMY       Current Outpatient Medications   Medication Sig Dispense Refill    amLODIPine (NORVASC) 5 MG tablet Take 1 tablet (5 mg) by mouth daily. 90 tablet 0    levothyroxine (SYNTHROID/LEVOTHROID) 150 MCG tablet Take 1 tablet (150 mcg) by mouth daily 90 tablet 3       Allergies   Allergen Reactions    Bee Sting Kit [Bee Venom] Unknown    Lisinopril Cough        Social History     Tobacco Use    Smoking status: Former     Current packs/day: 0.00     Average packs/day: 0.3 packs/day for 10.0 years (2.5 ttl pk-yrs)     Types: Cigarettes     Start date: 2007     Quit date: 2017     Years since quittin.8    Smokeless tobacco: Never   Substance Use Topics    Alcohol use: Yes     Comment: 3-4 beers a week     Family History   Problem Relation Age of Onset    Cancer Mother         Primary liver cancer    Other Cancer Mother         Liver Cancer     "Hepatitis Father     Liver Disease Father         hx hep c       History   Drug Use No           Objective    BP (!) 154/107 (BP Location: Right arm, Patient Position: Sitting, Cuff Size: Adult Large)   Pulse 93   Temp 98.1  F (36.7  C) (Oral)   Resp 16   Ht 1.734 m (5' 8.25\")   Wt 113 kg (249 lb 1.6 oz)   SpO2 98%   BMI 37.60 kg/m     Estimated body mass index is 37.6 kg/m  as calculated from the following:    Height as of this encounter: 1.734 m (5' 8.25\").    Weight as of this encounter: 113 kg (249 lb 1.6 oz).    Wt Readings from Last 5 Encounters:   10/23/24 113 kg (249 lb 1.6 oz)   09/27/23 101.2 kg (223 lb)   09/27/22 100.5 kg (221 lb 9.6 oz)   12/17/21 104.1 kg (229 lb 6.4 oz)   12/08/21 103 kg (227 lb)       Physical Exam  GENERAL: alert and no distress  EYES: Eyes grossly normal to inspection, conjunctivae and sclerae normal  HENT: ear canals and TM's normal, mouth without ulcers or lesions  RESP: lungs clear to auscultation - no rales, rhonchi or wheezes  CV: regular rate and rhythm, normal S1 S2, no S3 or S4, no murmur, click or rub, no peripheral edema  ABDOMEN: soft, nontender, no hepatosplenomegaly, no masses and bowel sounds normal  NEURO: Normal strength and tone, mentation intact and speech normal  PSYCH: mentation appears normal, affect normal/bright    Recent Labs   Lab Test 10/12/24  1043      POTASSIUM 4.0   CR 1.11        Diagnostics  No results found for this or any previous visit (from the past 48 hours).   EKG: appears normal, NSR, normal axis, normal intervals, no acute ST/T changes c/w ischemia, no LVH by voltage criteria    Revised Cardiac Risk Index (RCRI)  The patient has the following serious cardiovascular risks for perioperative complications:   - No serious cardiac risks = 0 points     RCRI Interpretation: 0 points: Class I (very low risk - 0.4% complication rate)       The longitudinal plan of care for the diagnosis(es)/condition(s) as documented were addressed during " this visit. Due to the added complexity in care, I will continue to support Jordan in the subsequent management and with ongoing continuity of care.  Signed Electronically by: Jaclyn López NP  A copy of this evaluation report is provided to the requesting physician.

## 2024-10-25 NOTE — ASSESSMENT & PLAN NOTE
Blood pressure needs to be better controlled in order to proceed with surgery.  - Start amlodipine 5 mg daily  - Check ACR at next office visit  -Follow-up blood pressure check and physical in 1 month

## 2024-10-28 ENCOUNTER — MYC MEDICAL ADVICE (OUTPATIENT)
Dept: INTERNAL MEDICINE | Facility: CLINIC | Age: 39
End: 2024-10-28
Payer: COMMERCIAL

## 2024-10-30 DIAGNOSIS — I10 PRIMARY HYPERTENSION: Primary | ICD-10-CM

## 2024-10-30 RX ORDER — HYDROCHLOROTHIAZIDE 25 MG/1
25 TABLET ORAL DAILY
Qty: 90 TABLET | Refills: 0 | Status: SHIPPED | OUTPATIENT
Start: 2024-10-30

## 2024-11-18 DIAGNOSIS — E89.0 POSTABLATIVE HYPOTHYROIDISM: ICD-10-CM

## 2024-11-18 RX ORDER — LEVOTHYROXINE SODIUM 150 UG/1
150 TABLET ORAL DAILY
Qty: 90 TABLET | Refills: 3 | Status: SHIPPED | OUTPATIENT
Start: 2024-11-18

## 2024-11-24 ENCOUNTER — ANESTHESIA (OUTPATIENT)
Dept: SURGERY | Facility: HOSPITAL | Age: 39
End: 2024-11-24
Payer: COMMERCIAL

## 2024-11-24 ENCOUNTER — APPOINTMENT (OUTPATIENT)
Dept: CT IMAGING | Facility: HOSPITAL | Age: 39
End: 2024-11-24
Attending: EMERGENCY MEDICINE
Payer: COMMERCIAL

## 2024-11-24 ENCOUNTER — APPOINTMENT (OUTPATIENT)
Dept: ULTRASOUND IMAGING | Facility: HOSPITAL | Age: 39
End: 2024-11-24
Attending: STUDENT IN AN ORGANIZED HEALTH CARE EDUCATION/TRAINING PROGRAM
Payer: COMMERCIAL

## 2024-11-24 ENCOUNTER — ANESTHESIA EVENT (OUTPATIENT)
Dept: SURGERY | Facility: HOSPITAL | Age: 39
End: 2024-11-24
Payer: COMMERCIAL

## 2024-11-24 ENCOUNTER — HOSPITAL ENCOUNTER (OUTPATIENT)
Facility: HOSPITAL | Age: 39
Setting detail: OBSERVATION
Discharge: HOME | End: 2024-11-25
Attending: EMERGENCY MEDICINE | Admitting: HOSPITALIST
Payer: COMMERCIAL

## 2024-11-24 DIAGNOSIS — K35.80 ACUTE APPENDICITIS, UNSPECIFIED ACUTE APPENDICITIS TYPE: Primary | ICD-10-CM

## 2024-11-24 DIAGNOSIS — K76.0 HEPATIC STEATOSIS: ICD-10-CM

## 2024-11-24 DIAGNOSIS — K35.30 ACUTE APPENDICITIS WITH LOCALIZED PERITONITIS, WITHOUT PERFORATION, ABSCESS, OR GANGRENE: ICD-10-CM

## 2024-11-24 DIAGNOSIS — Z86.79 HISTORY OF HYPERTENSION: ICD-10-CM

## 2024-11-24 LAB
ALBUMIN SERPL BCG-MCNC: 4.8 G/DL (ref 3.5–5.2)
ALBUMIN UR-MCNC: 20 MG/DL
ALP SERPL-CCNC: 91 U/L (ref 40–150)
ALT SERPL W P-5'-P-CCNC: 102 U/L (ref 0–70)
ANION GAP SERPL CALCULATED.3IONS-SCNC: 13 MMOL/L (ref 7–15)
APPEARANCE UR: CLEAR
AST SERPL W P-5'-P-CCNC: 65 U/L (ref 0–45)
BASOPHILS # BLD AUTO: 0.1 10E3/UL (ref 0–0.2)
BASOPHILS NFR BLD AUTO: 1 %
BILIRUB DIRECT SERPL-MCNC: 0.42 MG/DL (ref 0–0.3)
BILIRUB SERPL-MCNC: 1.7 MG/DL
BILIRUB UR QL STRIP: NEGATIVE
BUN SERPL-MCNC: 11 MG/DL (ref 6–20)
CALCIUM SERPL-MCNC: 9 MG/DL (ref 8.8–10.4)
CHLORIDE SERPL-SCNC: 95 MMOL/L (ref 98–107)
COLOR UR AUTO: ABNORMAL
CREAT SERPL-MCNC: 1.08 MG/DL (ref 0.67–1.17)
EGFRCR SERPLBLD CKD-EPI 2021: 90 ML/MIN/1.73M2
EOSINOPHIL # BLD AUTO: 0.3 10E3/UL (ref 0–0.7)
EOSINOPHIL NFR BLD AUTO: 3 %
ERYTHROCYTE [DISTWIDTH] IN BLOOD BY AUTOMATED COUNT: 12.3 % (ref 10–15)
EST. AVERAGE GLUCOSE BLD GHB EST-MCNC: 108 MG/DL
GLUCOSE SERPL-MCNC: 123 MG/DL (ref 70–99)
GLUCOSE UR STRIP-MCNC: NEGATIVE MG/DL
HBA1C MFR BLD: 5.4 %
HCO3 SERPL-SCNC: 27 MMOL/L (ref 22–29)
HCT VFR BLD AUTO: 41.9 % (ref 40–53)
HGB BLD-MCNC: 14.8 G/DL (ref 13.3–17.7)
HGB UR QL STRIP: NEGATIVE
IMM GRANULOCYTES # BLD: 0 10E3/UL
IMM GRANULOCYTES NFR BLD: 1 %
KETONES UR STRIP-MCNC: NEGATIVE MG/DL
LEUKOCYTE ESTERASE UR QL STRIP: NEGATIVE
LIPASE SERPL-CCNC: 32 U/L (ref 13–60)
LYMPHOCYTES # BLD AUTO: 1.6 10E3/UL (ref 0.8–5.3)
LYMPHOCYTES NFR BLD AUTO: 20 %
MAGNESIUM SERPL-MCNC: 1.7 MG/DL (ref 1.7–2.3)
MCH RBC QN AUTO: 32.2 PG (ref 26.5–33)
MCHC RBC AUTO-ENTMCNC: 35.3 G/DL (ref 31.5–36.5)
MCV RBC AUTO: 91 FL (ref 78–100)
MONOCYTES # BLD AUTO: 0.9 10E3/UL (ref 0–1.3)
MONOCYTES NFR BLD AUTO: 11 %
NEUTROPHILS # BLD AUTO: 5.4 10E3/UL (ref 1.6–8.3)
NEUTROPHILS NFR BLD AUTO: 65 %
NITRATE UR QL: NEGATIVE
NRBC # BLD AUTO: 0 10E3/UL
NRBC BLD AUTO-RTO: 0 /100
PH UR STRIP: 6.5 [PH] (ref 5–7)
PHOSPHATE SERPL-MCNC: 3.2 MG/DL (ref 2.5–4.5)
PLATELET # BLD AUTO: 207 10E3/UL (ref 150–450)
POTASSIUM SERPL-SCNC: 3.5 MMOL/L (ref 3.4–5.3)
POTASSIUM SERPL-SCNC: 3.6 MMOL/L (ref 3.4–5.3)
PROT SERPL-MCNC: 8.1 G/DL (ref 6.4–8.3)
RBC # BLD AUTO: 4.6 10E6/UL (ref 4.4–5.9)
RBC URINE: 1 /HPF
SODIUM SERPL-SCNC: 135 MMOL/L (ref 135–145)
SP GR UR STRIP: 1.02 (ref 1–1.03)
UROBILINOGEN UR STRIP-MCNC: <2 MG/DL
WBC # BLD AUTO: 8.3 10E3/UL (ref 4–11)
WBC URINE: <1 /HPF

## 2024-11-24 PROCEDURE — 74177 CT ABD & PELVIS W/CONTRAST: CPT

## 2024-11-24 PROCEDURE — 250N000025 HC SEVOFLURANE, PER MIN: Performed by: SURGERY

## 2024-11-24 PROCEDURE — 81003 URINALYSIS AUTO W/O SCOPE: CPT | Performed by: EMERGENCY MEDICINE

## 2024-11-24 PROCEDURE — 84132 ASSAY OF SERUM POTASSIUM: CPT | Performed by: STUDENT IN AN ORGANIZED HEALTH CARE EDUCATION/TRAINING PROGRAM

## 2024-11-24 PROCEDURE — 82248 BILIRUBIN DIRECT: CPT | Performed by: EMERGENCY MEDICINE

## 2024-11-24 PROCEDURE — 258N000003 HC RX IP 258 OP 636

## 2024-11-24 PROCEDURE — 250N000009 HC RX 250: Performed by: STUDENT IN AN ORGANIZED HEALTH CARE EDUCATION/TRAINING PROGRAM

## 2024-11-24 PROCEDURE — 99223 1ST HOSP IP/OBS HIGH 75: CPT | Performed by: STUDENT IN AN ORGANIZED HEALTH CARE EDUCATION/TRAINING PROGRAM

## 2024-11-24 PROCEDURE — 250N000013 HC RX MED GY IP 250 OP 250 PS 637: Performed by: SURGERY

## 2024-11-24 PROCEDURE — 272N000001 HC OR GENERAL SUPPLY STERILE: Performed by: SURGERY

## 2024-11-24 PROCEDURE — 96375 TX/PRO/DX INJ NEW DRUG ADDON: CPT

## 2024-11-24 PROCEDURE — 250N000011 HC RX IP 250 OP 636: Performed by: STUDENT IN AN ORGANIZED HEALTH CARE EDUCATION/TRAINING PROGRAM

## 2024-11-24 PROCEDURE — 96361 HYDRATE IV INFUSION ADD-ON: CPT

## 2024-11-24 PROCEDURE — 120N000001 HC R&B MED SURG/OB

## 2024-11-24 PROCEDURE — 83036 HEMOGLOBIN GLYCOSYLATED A1C: CPT | Performed by: STUDENT IN AN ORGANIZED HEALTH CARE EDUCATION/TRAINING PROGRAM

## 2024-11-24 PROCEDURE — 710N000009 HC RECOVERY PHASE 1, LEVEL 1, PER MIN: Performed by: SURGERY

## 2024-11-24 PROCEDURE — 96376 TX/PRO/DX INJ SAME DRUG ADON: CPT | Mod: 59

## 2024-11-24 PROCEDURE — 44970 LAPAROSCOPY APPENDECTOMY: CPT | Performed by: SURGERY

## 2024-11-24 PROCEDURE — 250N000009 HC RX 250

## 2024-11-24 PROCEDURE — 99285 EMERGENCY DEPT VISIT HI MDM: CPT | Mod: 25

## 2024-11-24 PROCEDURE — 250N000011 HC RX IP 250 OP 636

## 2024-11-24 PROCEDURE — 250N000011 HC RX IP 250 OP 636: Performed by: EMERGENCY MEDICINE

## 2024-11-24 PROCEDURE — 96367 TX/PROPH/DG ADDL SEQ IV INF: CPT | Mod: 59

## 2024-11-24 PROCEDURE — 80076 HEPATIC FUNCTION PANEL: CPT | Performed by: EMERGENCY MEDICINE

## 2024-11-24 PROCEDURE — 96374 THER/PROPH/DIAG INJ IV PUSH: CPT | Mod: 59

## 2024-11-24 PROCEDURE — 999N000141 HC STATISTIC PRE-PROCEDURE NURSING ASSESSMENT: Performed by: SURGERY

## 2024-11-24 PROCEDURE — 36415 COLL VENOUS BLD VENIPUNCTURE: CPT | Performed by: EMERGENCY MEDICINE

## 2024-11-24 PROCEDURE — 370N000017 HC ANESTHESIA TECHNICAL FEE, PER MIN: Performed by: SURGERY

## 2024-11-24 PROCEDURE — 84100 ASSAY OF PHOSPHORUS: CPT | Performed by: STUDENT IN AN ORGANIZED HEALTH CARE EDUCATION/TRAINING PROGRAM

## 2024-11-24 PROCEDURE — 258N000003 HC RX IP 258 OP 636: Performed by: SURGERY

## 2024-11-24 PROCEDURE — 36415 COLL VENOUS BLD VENIPUNCTURE: CPT | Performed by: STUDENT IN AN ORGANIZED HEALTH CARE EDUCATION/TRAINING PROGRAM

## 2024-11-24 PROCEDURE — 250N000011 HC RX IP 250 OP 636: Performed by: SURGERY

## 2024-11-24 PROCEDURE — 360N000076 HC SURGERY LEVEL 3, PER MIN: Performed by: SURGERY

## 2024-11-24 PROCEDURE — 81001 URINALYSIS AUTO W/SCOPE: CPT | Performed by: EMERGENCY MEDICINE

## 2024-11-24 PROCEDURE — 88304 TISSUE EXAM BY PATHOLOGIST: CPT | Mod: TC | Performed by: SURGERY

## 2024-11-24 PROCEDURE — 250N000009 HC RX 250: Performed by: SURGERY

## 2024-11-24 PROCEDURE — 83690 ASSAY OF LIPASE: CPT | Performed by: EMERGENCY MEDICINE

## 2024-11-24 PROCEDURE — 76705 ECHO EXAM OF ABDOMEN: CPT

## 2024-11-24 PROCEDURE — 258N000003 HC RX IP 258 OP 636: Performed by: STUDENT IN AN ORGANIZED HEALTH CARE EDUCATION/TRAINING PROGRAM

## 2024-11-24 PROCEDURE — 99222 1ST HOSP IP/OBS MODERATE 55: CPT | Mod: 57 | Performed by: SURGERY

## 2024-11-24 PROCEDURE — 85025 COMPLETE CBC W/AUTO DIFF WBC: CPT | Performed by: EMERGENCY MEDICINE

## 2024-11-24 PROCEDURE — 83735 ASSAY OF MAGNESIUM: CPT | Performed by: EMERGENCY MEDICINE

## 2024-11-24 PROCEDURE — 80048 BASIC METABOLIC PNL TOTAL CA: CPT | Performed by: EMERGENCY MEDICINE

## 2024-11-24 PROCEDURE — 96365 THER/PROPH/DIAG IV INF INIT: CPT

## 2024-11-24 RX ORDER — NALOXONE HYDROCHLORIDE 0.4 MG/ML
0.4 INJECTION, SOLUTION INTRAMUSCULAR; INTRAVENOUS; SUBCUTANEOUS
Status: DISCONTINUED | OUTPATIENT
Start: 2024-11-24 | End: 2024-11-25 | Stop reason: HOSPADM

## 2024-11-24 RX ORDER — OXYCODONE HYDROCHLORIDE 5 MG/1
10 TABLET ORAL EVERY 4 HOURS PRN
Status: DISCONTINUED | OUTPATIENT
Start: 2024-11-24 | End: 2024-11-25

## 2024-11-24 RX ORDER — LIDOCAINE HYDROCHLORIDE AND EPINEPHRINE 10; 10 MG/ML; UG/ML
INJECTION, SOLUTION INFILTRATION; PERINEURAL PRN
Status: DISCONTINUED | OUTPATIENT
Start: 2024-11-24 | End: 2024-11-24 | Stop reason: HOSPADM

## 2024-11-24 RX ORDER — KETAMINE HYDROCHLORIDE 10 MG/ML
INJECTION INTRAMUSCULAR; INTRAVENOUS PRN
Status: DISCONTINUED | OUTPATIENT
Start: 2024-11-24 | End: 2024-11-24

## 2024-11-24 RX ORDER — NALOXONE HYDROCHLORIDE 0.4 MG/ML
0.2 INJECTION, SOLUTION INTRAMUSCULAR; INTRAVENOUS; SUBCUTANEOUS
Status: DISCONTINUED | OUTPATIENT
Start: 2024-11-24 | End: 2024-11-25 | Stop reason: HOSPADM

## 2024-11-24 RX ORDER — BISACODYL 10 MG
10 SUPPOSITORY, RECTAL RECTAL DAILY PRN
Status: DISCONTINUED | OUTPATIENT
Start: 2024-11-27 | End: 2024-11-25 | Stop reason: HOSPADM

## 2024-11-24 RX ORDER — SODIUM CHLORIDE, SODIUM LACTATE, POTASSIUM CHLORIDE, CALCIUM CHLORIDE 600; 310; 30; 20 MG/100ML; MG/100ML; MG/100ML; MG/100ML
INJECTION, SOLUTION INTRAVENOUS CONTINUOUS PRN
Status: DISCONTINUED | OUTPATIENT
Start: 2024-11-24 | End: 2024-11-24

## 2024-11-24 RX ORDER — CALCIUM CARBONATE 500 MG/1
1000 TABLET, CHEWABLE ORAL 4 TIMES DAILY PRN
Status: DISCONTINUED | OUTPATIENT
Start: 2024-11-24 | End: 2024-11-25 | Stop reason: HOSPADM

## 2024-11-24 RX ORDER — ONDANSETRON 2 MG/ML
INJECTION INTRAMUSCULAR; INTRAVENOUS PRN
Status: DISCONTINUED | OUTPATIENT
Start: 2024-11-24 | End: 2024-11-24

## 2024-11-24 RX ORDER — AMOXICILLIN 250 MG
1 CAPSULE ORAL 2 TIMES DAILY
Status: DISCONTINUED | OUTPATIENT
Start: 2024-11-24 | End: 2024-11-25

## 2024-11-24 RX ORDER — HYDRALAZINE HYDROCHLORIDE 10 MG/1
10 TABLET, FILM COATED ORAL EVERY 4 HOURS PRN
Status: DISCONTINUED | OUTPATIENT
Start: 2024-11-24 | End: 2024-11-25 | Stop reason: HOSPADM

## 2024-11-24 RX ORDER — ACETAMINOPHEN 325 MG/1
650 TABLET ORAL EVERY 4 HOURS PRN
Status: DISCONTINUED | OUTPATIENT
Start: 2024-11-27 | End: 2024-11-25

## 2024-11-24 RX ORDER — SODIUM CHLORIDE, SODIUM LACTATE, POTASSIUM CHLORIDE, CALCIUM CHLORIDE 600; 310; 30; 20 MG/100ML; MG/100ML; MG/100ML; MG/100ML
INJECTION, SOLUTION INTRAVENOUS CONTINUOUS
Status: DISCONTINUED | OUTPATIENT
Start: 2024-11-24 | End: 2024-11-24 | Stop reason: HOSPADM

## 2024-11-24 RX ORDER — ONDANSETRON 2 MG/ML
4 INJECTION INTRAMUSCULAR; INTRAVENOUS EVERY 6 HOURS PRN
Status: DISCONTINUED | OUTPATIENT
Start: 2024-11-24 | End: 2024-11-25 | Stop reason: HOSPADM

## 2024-11-24 RX ORDER — PROPOFOL 10 MG/ML
INJECTION, EMULSION INTRAVENOUS PRN
Status: DISCONTINUED | OUTPATIENT
Start: 2024-11-24 | End: 2024-11-24

## 2024-11-24 RX ORDER — AMOXICILLIN 250 MG
1 CAPSULE ORAL 2 TIMES DAILY PRN
Status: DISCONTINUED | OUTPATIENT
Start: 2024-11-24 | End: 2024-11-25 | Stop reason: HOSPADM

## 2024-11-24 RX ORDER — HYDROMORPHONE HCL IN WATER/PF 6 MG/30 ML
0.4 PATIENT CONTROLLED ANALGESIA SYRINGE INTRAVENOUS EVERY 5 MIN PRN
Status: DISCONTINUED | OUTPATIENT
Start: 2024-11-24 | End: 2024-11-24 | Stop reason: HOSPADM

## 2024-11-24 RX ORDER — HYDROMORPHONE HCL IN WATER/PF 6 MG/30 ML
0.4 PATIENT CONTROLLED ANALGESIA SYRINGE INTRAVENOUS
Status: DISCONTINUED | OUTPATIENT
Start: 2024-11-24 | End: 2024-11-25

## 2024-11-24 RX ORDER — POLYETHYLENE GLYCOL 3350 17 G/17G
17 POWDER, FOR SOLUTION ORAL DAILY
Status: DISCONTINUED | OUTPATIENT
Start: 2024-11-25 | End: 2024-11-25 | Stop reason: HOSPADM

## 2024-11-24 RX ORDER — FENTANYL CITRATE 50 UG/ML
INJECTION, SOLUTION INTRAMUSCULAR; INTRAVENOUS PRN
Status: DISCONTINUED | OUTPATIENT
Start: 2024-11-24 | End: 2024-11-24

## 2024-11-24 RX ORDER — MAGNESIUM HYDROXIDE 1200 MG/15ML
LIQUID ORAL PRN
Status: DISCONTINUED | OUTPATIENT
Start: 2024-11-24 | End: 2024-11-24 | Stop reason: HOSPADM

## 2024-11-24 RX ORDER — HYDROMORPHONE HCL IN WATER/PF 6 MG/30 ML
0.2 PATIENT CONTROLLED ANALGESIA SYRINGE INTRAVENOUS
Status: DISCONTINUED | OUTPATIENT
Start: 2024-11-24 | End: 2024-11-25

## 2024-11-24 RX ORDER — ONDANSETRON 2 MG/ML
4 INJECTION INTRAMUSCULAR; INTRAVENOUS EVERY 6 HOURS PRN
Status: DISCONTINUED | OUTPATIENT
Start: 2024-11-24 | End: 2024-11-24

## 2024-11-24 RX ORDER — ACETAMINOPHEN 325 MG/1
650 TABLET ORAL EVERY 4 HOURS PRN
Status: DISCONTINUED | OUTPATIENT
Start: 2024-11-24 | End: 2024-11-24

## 2024-11-24 RX ORDER — DEXAMETHASONE SODIUM PHOSPHATE 4 MG/ML
4 INJECTION, SOLUTION INTRA-ARTICULAR; INTRALESIONAL; INTRAMUSCULAR; INTRAVENOUS; SOFT TISSUE
Status: DISCONTINUED | OUTPATIENT
Start: 2024-11-24 | End: 2024-11-24 | Stop reason: HOSPADM

## 2024-11-24 RX ORDER — IBUPROFEN 800 MG/1
800 TABLET, FILM COATED ORAL EVERY 8 HOURS PRN
COMMUNITY
Start: 2024-10-25

## 2024-11-24 RX ORDER — ONDANSETRON 4 MG/1
4 TABLET, ORALLY DISINTEGRATING ORAL EVERY 6 HOURS PRN
Status: DISCONTINUED | OUTPATIENT
Start: 2024-11-24 | End: 2024-11-25 | Stop reason: HOSPADM

## 2024-11-24 RX ORDER — PIPERACILLIN SODIUM, TAZOBACTAM SODIUM 3; .375 G/15ML; G/15ML
3.38 INJECTION, POWDER, LYOPHILIZED, FOR SOLUTION INTRAVENOUS ONCE
Status: COMPLETED | OUTPATIENT
Start: 2024-11-24 | End: 2024-11-24

## 2024-11-24 RX ORDER — SODIUM CHLORIDE, SODIUM LACTATE, POTASSIUM CHLORIDE, CALCIUM CHLORIDE 600; 310; 30; 20 MG/100ML; MG/100ML; MG/100ML; MG/100ML
INJECTION, SOLUTION INTRAVENOUS CONTINUOUS
Status: DISCONTINUED | OUTPATIENT
Start: 2024-11-24 | End: 2024-11-24

## 2024-11-24 RX ORDER — LIDOCAINE 40 MG/G
CREAM TOPICAL
Status: DISCONTINUED | OUTPATIENT
Start: 2024-11-24 | End: 2024-11-25 | Stop reason: HOSPADM

## 2024-11-24 RX ORDER — FENTANYL CITRATE 50 UG/ML
50 INJECTION, SOLUTION INTRAMUSCULAR; INTRAVENOUS EVERY 5 MIN PRN
Status: DISCONTINUED | OUTPATIENT
Start: 2024-11-24 | End: 2024-11-24 | Stop reason: HOSPADM

## 2024-11-24 RX ORDER — NALOXONE HYDROCHLORIDE 0.4 MG/ML
0.1 INJECTION, SOLUTION INTRAMUSCULAR; INTRAVENOUS; SUBCUTANEOUS
Status: DISCONTINUED | OUTPATIENT
Start: 2024-11-24 | End: 2024-11-24 | Stop reason: HOSPADM

## 2024-11-24 RX ORDER — IOPAMIDOL 755 MG/ML
90 INJECTION, SOLUTION INTRAVASCULAR ONCE
Status: COMPLETED | OUTPATIENT
Start: 2024-11-24 | End: 2024-11-24

## 2024-11-24 RX ORDER — HYDROMORPHONE HCL IN WATER/PF 6 MG/30 ML
0.2 PATIENT CONTROLLED ANALGESIA SYRINGE INTRAVENOUS EVERY 5 MIN PRN
Status: DISCONTINUED | OUTPATIENT
Start: 2024-11-24 | End: 2024-11-24 | Stop reason: HOSPADM

## 2024-11-24 RX ORDER — HYDRALAZINE HYDROCHLORIDE 20 MG/ML
10 INJECTION INTRAMUSCULAR; INTRAVENOUS EVERY 4 HOURS PRN
Status: DISCONTINUED | OUTPATIENT
Start: 2024-11-24 | End: 2024-11-25 | Stop reason: HOSPADM

## 2024-11-24 RX ORDER — FENTANYL CITRATE 50 UG/ML
25 INJECTION, SOLUTION INTRAMUSCULAR; INTRAVENOUS EVERY 5 MIN PRN
Status: DISCONTINUED | OUTPATIENT
Start: 2024-11-24 | End: 2024-11-24 | Stop reason: HOSPADM

## 2024-11-24 RX ORDER — CEFAZOLIN SODIUM/WATER 2 G/20 ML
2 SYRINGE (ML) INTRAVENOUS
Status: COMPLETED | OUTPATIENT
Start: 2024-11-24 | End: 2024-11-24

## 2024-11-24 RX ORDER — DEXAMETHASONE SODIUM PHOSPHATE 10 MG/ML
INJECTION, SOLUTION INTRAMUSCULAR; INTRAVENOUS PRN
Status: DISCONTINUED | OUTPATIENT
Start: 2024-11-24 | End: 2024-11-24

## 2024-11-24 RX ORDER — OXYCODONE HYDROCHLORIDE 5 MG/1
5 TABLET ORAL EVERY 4 HOURS PRN
Status: DISCONTINUED | OUTPATIENT
Start: 2024-11-24 | End: 2024-11-25

## 2024-11-24 RX ORDER — MORPHINE SULFATE 4 MG/ML
4 INJECTION, SOLUTION INTRAMUSCULAR; INTRAVENOUS ONCE
Status: COMPLETED | OUTPATIENT
Start: 2024-11-24 | End: 2024-11-24

## 2024-11-24 RX ORDER — CEFAZOLIN SODIUM/WATER 2 G/20 ML
2 SYRINGE (ML) INTRAVENOUS SEE ADMIN INSTRUCTIONS
Status: DISCONTINUED | OUTPATIENT
Start: 2024-11-24 | End: 2024-11-25 | Stop reason: HOSPADM

## 2024-11-24 RX ORDER — HYDROXYZINE HYDROCHLORIDE 25 MG/1
25 TABLET, FILM COATED ORAL EVERY 6 HOURS PRN
Status: DISCONTINUED | OUTPATIENT
Start: 2024-11-24 | End: 2024-11-25 | Stop reason: HOSPADM

## 2024-11-24 RX ORDER — ACETAMINOPHEN 325 MG/1
975 TABLET ORAL EVERY 8 HOURS
Status: DISCONTINUED | OUTPATIENT
Start: 2024-11-24 | End: 2024-11-25 | Stop reason: HOSPADM

## 2024-11-24 RX ORDER — SODIUM CHLORIDE 9 MG/ML
INJECTION, SOLUTION INTRAVENOUS CONTINUOUS
Status: ACTIVE | OUTPATIENT
Start: 2024-11-24 | End: 2024-11-25

## 2024-11-24 RX ORDER — PHENOL 1.4 %
10 AEROSOL, SPRAY (ML) MUCOUS MEMBRANE
COMMUNITY

## 2024-11-24 RX ORDER — OXYCODONE HYDROCHLORIDE 5 MG/1
5 TABLET ORAL EVERY 4 HOURS PRN
Status: DISCONTINUED | OUTPATIENT
Start: 2024-11-24 | End: 2024-11-24

## 2024-11-24 RX ORDER — MAGNESIUM SULFATE 4 G/50ML
4 INJECTION INTRAVENOUS ONCE
Status: COMPLETED | OUTPATIENT
Start: 2024-11-24 | End: 2024-11-24

## 2024-11-24 RX ORDER — HYDROXYZINE HYDROCHLORIDE 25 MG/1
1-2 TABLET, FILM COATED ORAL EVERY 6 HOURS PRN
COMMUNITY
Start: 2024-10-25

## 2024-11-24 RX ORDER — ONDANSETRON 4 MG/1
4 TABLET, ORALLY DISINTEGRATING ORAL EVERY 6 HOURS PRN
Status: DISCONTINUED | OUTPATIENT
Start: 2024-11-24 | End: 2024-11-24

## 2024-11-24 RX ORDER — LIDOCAINE HYDROCHLORIDE 10 MG/ML
INJECTION, SOLUTION INFILTRATION; PERINEURAL PRN
Status: DISCONTINUED | OUTPATIENT
Start: 2024-11-24 | End: 2024-11-24

## 2024-11-24 RX ORDER — DOCUSATE SODIUM 100 MG/1
100 CAPSULE, LIQUID FILLED ORAL 2 TIMES DAILY
Qty: 30 CAPSULE | Refills: 0 | Status: SHIPPED | OUTPATIENT
Start: 2024-11-24

## 2024-11-24 RX ORDER — ACETAMINOPHEN 650 MG/1
650 SUPPOSITORY RECTAL EVERY 4 HOURS PRN
Status: DISCONTINUED | OUTPATIENT
Start: 2024-11-24 | End: 2024-11-25 | Stop reason: HOSPADM

## 2024-11-24 RX ORDER — PROCHLORPERAZINE MALEATE 10 MG
10 TABLET ORAL EVERY 6 HOURS PRN
Status: DISCONTINUED | OUTPATIENT
Start: 2024-11-24 | End: 2024-11-25 | Stop reason: HOSPADM

## 2024-11-24 RX ORDER — AMOXICILLIN 250 MG
2 CAPSULE ORAL 2 TIMES DAILY PRN
Status: DISCONTINUED | OUTPATIENT
Start: 2024-11-24 | End: 2024-11-25 | Stop reason: HOSPADM

## 2024-11-24 RX ORDER — ONDANSETRON 4 MG/1
4 TABLET, ORALLY DISINTEGRATING ORAL EVERY 30 MIN PRN
Status: DISCONTINUED | OUTPATIENT
Start: 2024-11-24 | End: 2024-11-24 | Stop reason: HOSPADM

## 2024-11-24 RX ORDER — HYDROCODONE BITARTRATE AND ACETAMINOPHEN 5; 325 MG/1; MG/1
1 TABLET ORAL EVERY 6 HOURS PRN
Qty: 10 TABLET | Refills: 0 | Status: SHIPPED | OUTPATIENT
Start: 2024-11-24 | End: 2024-11-27

## 2024-11-24 RX ORDER — HEPARIN SODIUM 5000 [USP'U]/.5ML
5000 INJECTION, SOLUTION INTRAVENOUS; SUBCUTANEOUS EVERY 8 HOURS
Status: DISCONTINUED | OUTPATIENT
Start: 2024-11-25 | End: 2024-11-25 | Stop reason: HOSPADM

## 2024-11-24 RX ORDER — HYDROCHLOROTHIAZIDE 25 MG/1
25 TABLET ORAL DAILY
Status: DISCONTINUED | OUTPATIENT
Start: 2024-11-25 | End: 2024-11-25 | Stop reason: HOSPADM

## 2024-11-24 RX ORDER — ONDANSETRON 2 MG/ML
4 INJECTION INTRAMUSCULAR; INTRAVENOUS EVERY 30 MIN PRN
Status: DISCONTINUED | OUTPATIENT
Start: 2024-11-24 | End: 2024-11-24 | Stop reason: HOSPADM

## 2024-11-24 RX ORDER — PIPERACILLIN SODIUM, TAZOBACTAM SODIUM 3; .375 G/15ML; G/15ML
3.38 INJECTION, POWDER, LYOPHILIZED, FOR SOLUTION INTRAVENOUS EVERY 8 HOURS
Status: DISCONTINUED | OUTPATIENT
Start: 2024-11-24 | End: 2024-11-25

## 2024-11-24 RX ADMIN — MAGNESIUM SULFATE HEPTAHYDRATE 4 G: 80 INJECTION, SOLUTION INTRAVENOUS at 15:03

## 2024-11-24 RX ADMIN — HYDROMORPHONE HYDROCHLORIDE 0.4 MG: 1 INJECTION, SOLUTION INTRAMUSCULAR; INTRAVENOUS; SUBCUTANEOUS at 17:06

## 2024-11-24 RX ADMIN — KETAMINE HYDROCHLORIDE 50 MG: 10 INJECTION INTRAMUSCULAR; INTRAVENOUS at 20:32

## 2024-11-24 RX ADMIN — PROPOFOL 200 MG: 10 INJECTION, EMULSION INTRAVENOUS at 20:27

## 2024-11-24 RX ADMIN — LIDOCAINE HYDROCHLORIDE 100 MG: 10 INJECTION, SOLUTION INFILTRATION; PERINEURAL at 20:27

## 2024-11-24 RX ADMIN — SODIUM CHLORIDE: 9 INJECTION, SOLUTION INTRAVENOUS at 14:59

## 2024-11-24 RX ADMIN — MIDAZOLAM HYDROCHLORIDE 2 MG: 1 INJECTION, SOLUTION INTRAMUSCULAR; INTRAVENOUS at 20:19

## 2024-11-24 RX ADMIN — MORPHINE SULFATE 4 MG: 4 INJECTION, SOLUTION INTRAMUSCULAR; INTRAVENOUS at 14:55

## 2024-11-24 RX ADMIN — Medication 2 G: at 20:31

## 2024-11-24 RX ADMIN — FENTANYL CITRATE 50 MCG: 50 INJECTION, SOLUTION INTRAMUSCULAR; INTRAVENOUS at 21:53

## 2024-11-24 RX ADMIN — PIPERACILLIN AND TAZOBACTAM 3.38 G: 3; .375 INJECTION, POWDER, FOR SOLUTION INTRAVENOUS at 13:57

## 2024-11-24 RX ADMIN — FENTANYL CITRATE 50 MCG: 50 INJECTION, SOLUTION INTRAMUSCULAR; INTRAVENOUS at 22:00

## 2024-11-24 RX ADMIN — DEXAMETHASONE SODIUM PHOSPHATE 10 MG: 10 INJECTION, SOLUTION INTRAMUSCULAR; INTRAVENOUS at 20:36

## 2024-11-24 RX ADMIN — ONDANSETRON 4 MG: 2 INJECTION INTRAMUSCULAR; INTRAVENOUS at 20:36

## 2024-11-24 RX ADMIN — FENTANYL CITRATE 50 MCG: 50 INJECTION, SOLUTION INTRAMUSCULAR; INTRAVENOUS at 20:28

## 2024-11-24 RX ADMIN — SENNOSIDES AND DOCUSATE SODIUM 1 TABLET: 8.6; 5 TABLET ORAL at 23:58

## 2024-11-24 RX ADMIN — SODIUM CHLORIDE, POTASSIUM CHLORIDE, SODIUM LACTATE AND CALCIUM CHLORIDE: 600; 310; 30; 20 INJECTION, SOLUTION INTRAVENOUS at 20:19

## 2024-11-24 RX ADMIN — HYDROMORPHONE HYDROCHLORIDE 0.2 MG: 0.2 INJECTION, SOLUTION INTRAMUSCULAR; INTRAVENOUS; SUBCUTANEOUS at 23:38

## 2024-11-24 RX ADMIN — ROCURONIUM BROMIDE 70 MG: 50 INJECTION, SOLUTION INTRAVENOUS at 20:27

## 2024-11-24 RX ADMIN — SUGAMMADEX 200 MG: 100 INJECTION, SOLUTION INTRAVENOUS at 21:11

## 2024-11-24 RX ADMIN — FENTANYL CITRATE 50 MCG: 50 INJECTION, SOLUTION INTRAMUSCULAR; INTRAVENOUS at 20:27

## 2024-11-24 RX ADMIN — DEXMEDETOMIDINE HYDROCHLORIDE 8 MCG: 100 INJECTION, SOLUTION INTRAVENOUS at 20:27

## 2024-11-24 RX ADMIN — HYDROMORPHONE HYDROCHLORIDE 1 MG: 1 INJECTION, SOLUTION INTRAMUSCULAR; INTRAVENOUS; SUBCUTANEOUS at 21:14

## 2024-11-24 RX ADMIN — IOPAMIDOL 90 ML: 755 INJECTION, SOLUTION INTRAVENOUS at 12:28

## 2024-11-24 RX ADMIN — ACETAMINOPHEN 975 MG: 325 TABLET ORAL at 23:37

## 2024-11-24 RX ADMIN — DEXMEDETOMIDINE HYDROCHLORIDE 12 MCG: 100 INJECTION, SOLUTION INTRAVENOUS at 20:30

## 2024-11-24 ASSESSMENT — ACTIVITIES OF DAILY LIVING (ADL)
ADLS_ACUITY_SCORE: 0

## 2024-11-24 ASSESSMENT — COLUMBIA-SUICIDE SEVERITY RATING SCALE - C-SSRS
6. HAVE YOU EVER DONE ANYTHING, STARTED TO DO ANYTHING, OR PREPARED TO DO ANYTHING TO END YOUR LIFE?: NO
2. HAVE YOU ACTUALLY HAD ANY THOUGHTS OF KILLING YOURSELF IN THE PAST MONTH?: NO
1. IN THE PAST MONTH, HAVE YOU WISHED YOU WERE DEAD OR WISHED YOU COULD GO TO SLEEP AND NOT WAKE UP?: NO

## 2024-11-24 NOTE — MEDICATION SCRIBE - ADMISSION MEDICATION HISTORY
Medication Scribe Admission Medication History    Admission medication history is complete. The information provided in this note is only as accurate as the sources available at the time of the update.    Information Source(s): Patient and CareEverywhere/SureScripts via in-person    Pertinent Information: pt states no longer taking amlodipine 5 mg     Changes made to PTA medication list:  Added: None  Deleted: None  Changed: None    Allergies reviewed with patient and updates made in EHR: yes    Medication History Completed By: ERVIN GAXIOLA 11/24/2024 3:05 PM    PTA Med List   Medication Sig Last Dose/Taking    hydrochlorothiazide (HYDRODIURIL) 25 MG tablet Take 1 tablet (25 mg) by mouth daily. 11/24/2024 Morning    hydrOXYzine HCl (ATARAX) 25 MG tablet Take 1-2 tablets by mouth every 6 hours as needed. 11/23/2024 Bedtime    ibuprofen (ADVIL/MOTRIN) 800 MG tablet Take 800 mg by mouth every 8 hours as needed. 11/23/2024 Evening    levothyroxine (SYNTHROID/LEVOTHROID) 150 MCG tablet TAKE 1 TABLET(150 MCG) BY MOUTH DAILY 11/24/2024 Morning    Melatonin 10 MG TABS tablet Take 10 mg by mouth nightly as needed for sleep. 11/23/2024

## 2024-11-24 NOTE — ED NOTES
"[facility] ED Handoff Report    ED Chief Complaint abd pain    ED Diagnosis:  (K35.80) Acute appendicitis, unspecified acute appendicitis type  (primary encounter diagnosis)  Comment:  surgery 2100 tonight  Plan: Case Request: APPENDECTOMY, LAPAROSCOPIC            (K35.30) Acute appendicitis with localized peritonitis, without perforation, abscess, or gangrene  Comment:   Plan:     (Z86.79) History of hypertension  Comment:   Plan:     (K76.0) Hepatic steatosis  Comment:   Plan:        PMH:    Past Medical History:   Diagnosis Date    Closed displaced fracture of proximal phalanx of lesser toe of left foot with routine healing 12/08/2021    Disease of thyroid gland         Code Status:  No Order     Falls Risk: Yes Band: Applied    Current Living Situation/Residence: lives with a significant other and lives in a house     Elimination Status: Continent: Yes     Activity Level: Independent    Patients Preferred Language:  English     Needed: No    Vital Signs:  BP (!) 167/98   Pulse 95   Temp 97.6  F (36.4  C) (Oral)   Resp 18   Ht 1.727 m (5' 8\")   Wt 115.2 kg (253 lb 14.4 oz)   SpO2 99%   BMI 38.61 kg/m       Cardiac Rhythm: NSR    Pain Score: 3/10    Is the Patient Confused:  No    Last Food or Drink: 11/24/24 at 1000    Focused Assessment:  39 M c/o mid abd pain off and on since his PT appt on Friday. Last PO intake 10am. Denies N/V/D. Was recently restarted on a BP med latest /112. Scheduled for appy at 2100 tonight. Awaiting US results as liver enzymes were eelvated    Tests Performed: Done: Labs and Imaging    Treatments Provided:  Zosyn, mag bump, pain meds    Family Dynamics/Concerns: No    Family Updated On Visitor Policy: Yes    Plan of Care Communicated to Family: Yes    Who Was Updated about Plan of Care: pt has been in contact with his wife    Belongings Checklist Done and Signed by Patient: Yes    Belongings Sent with Patient: yes    Medications sent with patient: " no    Covid: asymptomatic , not tested    Additional Information: just dosed with dilaudid     RN: Yasmin Marie RN   11/24/2024 5:12 PM

## 2024-11-24 NOTE — H&P
[facility]    History and Physical - Hospitalist Service       Date of Admission:  11/24/2024    Assessment & Plan    Assessment:  Gustavo Becerra is a 39 year old male with a past medical history of hypertension, hypothyroidism presented to the ER for evaluation of abdominal pain, patient has cramping central lower abdominal pain since yesterday afternoon shortly after eating, had a softer stool and darker urine this morning, because of abdominal pain he presented to the ER where vitals were significant for elevated blood pressure, labs significant for mild hypochloremia, transaminitis and hyperbilirubinemia, mild hyperglycemia, CT scan of the abdomen was performed which showed Acute appendicitis without abscess. Marked diffuse hepatic steatosis.  ED provider discussed with surgery who are trying to find an OR schedule for the patient and wanted to have patient admitted, have pain control and keep the patient n.p.o. for now, patient received Zosyn and morphine in the ED and is going to be admitted for acute appendicitis possibly requiring operative repair.    Problem list and plan:  Acute appendicitis  Patient presented to the ER for evaluation of abdominal pain  Patient has cramping central lower abdominal pain since yesterday afternoon shortly after eating  Had a softer stool and darker urine this morning  CT scan of the abdomen was performed which showed Acute appendicitis without abscess.   ED provider discussed with surgery who are trying to find an OR schedule for the patient and wanted to have patient admitted, have pain control and keep the patient n.p.o. for now  Patient received Zosyn and morphine in the ED   Continue with broad-spectrum antibiotics  Continue with pain management as per protocol and antiemetics as appropriate  Keep n.p.o., bowel rest, serial abdominal examination    Electrolyte abnormality/hypochloremia, hypokalemia and hypomagnesemia  Gentle IV  "hydration  Replete electrolytes, monitor electrolyte levels    Transaminitis and hyperbilirubinemia possibly secondary to hepatic steatosis  Trend LFTs  Liver sonogram showed Echogenic and attenuating hepatic parenchyma consistent with diffuse hepatocellular disease, most commonly steatosis.   CT scan showed evidence of Marked diffuse hepatic steatosis.     Mild hyperglycemia most likely reactive  Follow-up hemoglobin A1c  Monitor blood sugar level intermittently    History of hypertension with high blood pressure  Monitor vital signs as per protocol  IV hydralazine as needed for elevated blood pressure  Continue with hydrochlorothiazide    History of hypothyroidism  Continue with levothyroxine    Miscellaneous meds  Continue with hydroxyzine    DVT PPx  Intermittent pneumatic compression    CODE STATUS  Full code as per discussion with the patient        Diet: NPO per Anesthesia Guidelines for Procedure/Surgery Except for: Meds    DVT Prophylaxis: Pneumatic Compression Devices  Castano Catheter: Not present  Lines: None     Cardiac Monitoring: None  Code Status: Full Code  Mental status: Patient is alert awake and oriented x 3, patient is a good Historian and most of the history was obtained from the patient, some history was obtained from chart review and the rest of the history was obtained from discussion with the ER physician    Clinically Significant Risk Factors Present on Admission          # Hypochloremia: Lowest Cl = 95 mmol/L in last 2 days, will monitor as appropriate          # Hypertension: Noted on problem list           # Obesity: Estimated body mass index is 38.61 kg/m  as calculated from the following:    Height as of this encounter: 1.727 m (5' 8\").    Weight as of this encounter: 115.2 kg (253 lb 14.4 oz).              Disposition Plan     Medically Ready for Discharge: Anticipated in 2-4 Days     Saad J. Gondal, MD  Hospitalist Service  [facility]  Securely message with " Kate (more info)  Text page via [facility] Paging/Directory     ______________________________________________________________________    Chief Complaint   Abdominal pain    History is obtained from the patient    History of Present Illness   Gustavo Becerra is a 39 year old male with a past medical history of hypertension, hypothyroidism presented to the ER for evaluation of abdominal pain, patient has cramping central lower abdominal pain since yesterday afternoon shortly after eating, had a softer stool and darker urine this morning, because of abdominal pain he presented to the ER where vitals were significant for elevated blood pressure, labs significant for mild hypochloremia, transaminitis and hyperbilirubinemia, mild hyperglycemia, CT scan of the abdomen was performed which showed Acute appendicitis without abscess. Marked diffuse hepatic steatosis.  ED provider discussed with surgery who are trying to find an OR schedule for the patient and wanted to have patient admitted, have pain control and keep the patient n.p.o. for now, patient received Zosyn and morphine in the ED and is going to be admitted for acute appendicitis possibly requiring operative repair.      Past Medical History    Past Medical History:   Diagnosis Date    Closed displaced fracture of proximal phalanx of lesser toe of left foot with routine healing 12/08/2021    Disease of thyroid gland        Past Surgical History   Past Surgical History:   Procedure Laterality Date    ADENOIDECTOMY         Prior to Admission Medications   Prior to Admission Medications   Prescriptions Last Dose Informant Patient Reported? Taking?   Melatonin 10 MG TABS tablet 11/23/2024 Bedtime  Yes Yes   Sig: Take 10 mg by mouth nightly as needed for sleep.   hydrOXYzine HCl (ATARAX) 25 MG tablet 11/23/2024 Bedtime  Yes Yes   Sig: Take 1-2 tablets by mouth every 6 hours as needed.   hydrochlorothiazide (HYDRODIURIL) 25 MG tablet 11/24/2024 Morning  No Yes   Sig: Take  1 tablet (25 mg) by mouth daily.   ibuprofen (ADVIL/MOTRIN) 800 MG tablet 11/23/2024 Evening  Yes Yes   Sig: Take 800 mg by mouth every 8 hours as needed.   levothyroxine (SYNTHROID/LEVOTHROID) 150 MCG tablet 11/24/2024 Morning  No Yes   Sig: TAKE 1 TABLET(150 MCG) BY MOUTH DAILY      Facility-Administered Medications: None        Review of Systems    The 10 point Review of Systems is negative other than noted in the HPI or here.  Abdominal pain    Physical Exam   Vital Signs: Temp: 97.6  F (36.4  C) Temp src: Oral BP: (!) 167/98 Pulse: 95   Resp: 18 SpO2: 99 % O2 Device: None (Room air)    Weight: 253 lbs 14.4 oz    GENERAL: Patient was seen and examined at bedside with no acute distress  HENT:  Head is normocephalic, atraumatic.   EYES:  Eyes have anicteric sclerae without conjunctival injection   LUNGS: Bilateral air entry, clear to auscultation bilaterally  CARDIOVASCULAR:  Regular rate and rhythm with no murmurs, gallops or rubs.  ABDOMEN:  Normal bowel sounds. Soft; tenderness to palpation in the lower abdominal quadrant, tenderness at the umbilicus  EXT: no edema    SKIN:  No acute rashes.   NEUROLOGIC:  Grossly nonfocal.      Medical Decision Making       80 MINUTES SPENT BY ME on the date of service doing chart review, history, exam, documentation & further activities per the note.      Data     I have personally reviewed the following data over the past 24 hrs:    8.3  \   14.8   / 207     135 95 (L) 11.0 /  123 (H)   3.6 27 1.08 \     ALT: 102 (H) AST: 65 (H) AP: 91 TBILI: 1.7 (H)   ALB: 4.8 TOT PROTEIN: 8.1 LIPASE: 32     TSH: N/A T4: N/A A1C: 5.4       Imaging results reviewed over the past 24 hrs:   Recent Results (from the past 24 hours)   CT Abdomen Pelvis w Contrast    Narrative    EXAM: CT ABDOMEN PELVIS W CONTRAST  LOCATION: [facility]  DATE: 11/24/2024    INDICATION: Abdominal pain umbilical hernia bilaterally evaluate for appendicitis diverticulitis  obstruction.  COMPARISON: None.  TECHNIQUE: CT scan of the abdomen and pelvis was performed following injection of IV contrast. Multiplanar reformats were obtained. Dose reduction techniques were used.  CONTRAST: isovue 370 90ml    FINDINGS:   LOWER CHEST: Normal.    HEPATOBILIARY: Marked diffuse hepatic steatosis.    PANCREAS: Normal.    SPLEEN: Normal.    ADRENAL GLANDS: Normal.    KIDNEYS/BLADDER: Normal.    BOWEL: Mild Acute appendicitis without complication. No abscess. No free air.    LYMPH NODES: Normal.    VASCULATURE: Normal.    PELVIC ORGANS: Normal.    MUSCULOSKELETAL: Normal.      Impression    IMPRESSION:   1.  Acute appendicitis without abscess.    2.  Marked diffuse hepatic steatosis.   US Abdomen Limited    Narrative    EXAM: US ABDOMEN LIMITED  LOCATION: [facility]  DATE: 11/24/2024    INDICATION: abnormal LFT  COMPARISON: None.  TECHNIQUE: Limited abdominal ultrasound.    FINDINGS:    GALLBLADDER: Normal. No gallstones, wall thickening, or pericholecystic fluid. Negative sonographic Eldridge's sign.    BILE DUCTS: No biliary dilatation. The common duct measures 5 mm.    LIVER: Increased echogenicity from diffuse fatty infiltration. No focal mass.    RIGHT KIDNEY: No hydronephrosis.    PANCREAS: The pancreas is largely obscured by overlying gas.    No ascites.      Impression    IMPRESSION:  Echogenic and attenuating hepatic parenchyma consistent with diffuse hepatocellular disease, most commonly steatosis.

## 2024-11-24 NOTE — ED NOTES
Pt placed on cardiac monitor prior to IV Magnesium dose. Monitor shows SR rate 103. He is updated re plan of care. Surgery scheduled for 2100 tonight. He verbalizes understanding.Last PO intake 1000.

## 2024-11-24 NOTE — ED NOTES
"Pt reports has been in PT post op and felt as  he \"got out of a chair, twisted funny\" and since then c/o low abd pain. Denies N/V/D, constipation, dysuria. Last dosed with advil last night. Rates pain 7/10. Declines pain meds when offered. He drove himself here today.  "

## 2024-11-24 NOTE — ED PROVIDER NOTES
EMERGENCY DEPARTMENT ENCOUNTER      NAME: Gustavo Becerra  AGE: 39 year old male  YOB: 1985  MRN: [id number]  EVALUATION DATE & TIME: No admission date for patient encounter.    PCP: Jaclyn López    ED PROVIDER: Misty Norman M.D.      CHIEF COMPLAINT     Chief Complaint   Patient presents with    Abdominal Pain         FINAL IMPRESSION:     1. Acute appendicitis, unspecified acute appendicitis type    2. Acute appendicitis with localized peritonitis, without perforation, abscess, or gangrene    3. History of hypertension    4. Hepatic steatosis          MEDICAL DECISION MAKING:     ED Course as of 11/24/24 1428   Sun Nov 24, 2024   1154 39-year-old male presents here with abdominal pain.  Started yesterday.  Associated with some loose stools and dark urine denies any vomiting.   1155 Had shoulder surgery last month did well.  No current antibiotics no recent travel.   1155 Denies chest pain shortness of breath fevers or chills no leg swelling no rashes.  Known history of hypertension and hypothyroidism took both medications prior to coming.   1155 On examination well-appearing cooperative and pleasant elevated BMI protuberant abdomen small umbilical hernia easily to reduce.   1155 No localized expert patient no guarding or rebound.   1155 Differential diagnosis for abdominal pain since yesterday lower includes but not limited to obstruction diverticulitis infection dissection aneurysm among others.   1155 IV Established.  Patient given 1 L normal saline.   1156 Labs normal white blood cell count hemoglobin.  He does have elevated liver function test no pain in the upper abdomen.  Will continue with CT.   1238 Reevaluated.  Declines anything for pain.  No right upper quadrant tenderness palpation.   1238 Urine Orange in color but no evidence of nitrates or red blood cells.   1425 CT independently interpreted by me no free air.  Formal read reveals appendicitis.  Spoke with general surgery.  No open  operating was at this moment recommends Zosyn and admission to the hospitalist service.  Spoke with the hospitalist who can accept the patient for admission.  Patient given Zosyn morphine will continue n.p.o. until we hear from the surgeon.       Medical Decision Making    History:  Supplemental history from: None  External Record([facility]) reviewed: [facility] 10/30/2024 hypertension    Work Up:  Chart documentation includes differential considered and any EKGs or imaging independently interpreted by provider, where specified.  In additional to work up documented, I considered the following work up: Right upper quadrant ultrasound patient has no abdominal pain in the right upper quadrant.    External consultation:  Discussion of management with another provider: General Surgery (Dr. Bearden), Hospitalist (Dr. Gondal)    Complicating factors:  Care impacted by chronic illness: Hypertension      Disposition considerations: Admit.    Not Applicable        ED COURSE     11:29 AM I introduced myself to the patient, obtained patient history, performed a physical exam, and discussed plan for ED workup including potential diagnostic laboratory/imaging studies and interventions.  1:46 PM Call placed to general surgery.  1:52 PM Spoke with Dr. Bearden, general surgery. Discussed patient's case and recommendations. Plan to admit to hospitalist, keep patient NPO for now.  2:26 PM I spoke with the hospitalist, Dr. Nava. We discussed the patient's case and they agree to admit the patient.     At the conclusion of the encounter I discussed the results of all of the tests and the disposition. The questions were answered. The patient acknowledged understanding and was agreeable with the care plan.         MEDICATIONS GIVEN IN THE EMERGENCY:     Medications   piperacillin-tazobactam (ZOSYN) 3.375 g vial to attach to  mL bag (has no administration in time range)   morphine (PF) injection 4 mg (has no administration in time range)    lidocaine 1 % 0.1-1 mL (has no administration in time range)   lidocaine (LMX4) cream (has no administration in time range)   sodium chloride (PF) 0.9% PF flush 3 mL (has no administration in time range)   sodium chloride (PF) 0.9% PF flush 3 mL (has no administration in time range)   senna-docusate (SENOKOT-S/PERICOLACE) 8.6-50 MG per tablet 1 tablet (has no administration in time range)     Or   senna-docusate (SENOKOT-S/PERICOLACE) 8.6-50 MG per tablet 2 tablet (has no administration in time range)   calcium carbonate (TUMS) chewable tablet 1,000 mg (has no administration in time range)   hydrALAZINE (APRESOLINE) tablet 10 mg (has no administration in time range)     Or   hydrALAZINE (APRESOLINE) injection 10 mg (has no administration in time range)   sodium chloride 0.9 % infusion (has no administration in time range)   magnesium sulfate 4 g in 50 mL sterile water intermittent infusion (has no administration in time range)   acetaminophen (TYLENOL) tablet 650 mg (has no administration in time range)     Or   acetaminophen (TYLENOL) Suppository 650 mg (has no administration in time range)   oxyCODONE IR (ROXICODONE) half-tab 2.5 mg (has no administration in time range)   oxyCODONE (ROXICODONE) tablet 5 mg (has no administration in time range)   HYDROmorphone (DILAUDID) injection 0.2 mg (has no administration in time range)   HYDROmorphone (DILAUDID) injection 0.4 mg (has no administration in time range)   ondansetron (ZOFRAN ODT) ODT tab 4 mg (has no administration in time range)     Or   ondansetron (ZOFRAN) injection 4 mg (has no administration in time range)   iopamidol (ISOVUE-370) solution 90 mL (90 mLs Intravenous $Given 11/24/24 122)   piperacillin-tazobactam (ZOSYN) 3.375 g vial to attach to  mL bag (3.375 g Intravenous $New Bag 11/24/24 1357)       NEW PRESCRIPTIONS STARTED AT TODAY'S ER VISIT     New Prescriptions    No medications on file           =================================================================    HPI     Patient information was obtained from: Patient    Use of : N/A       Gustavo Becerra is a 39 year old male who presents by private vehicle for evaluation of abdominal pain.    The patient reports he has had cramping central lower abdominal pain since yesterday afternoon shortly after eating. He had a softer stool and darker urine this morning but he has not had any dysuria or hematuria. He denies any chest pain, shortness of breath, fever, or chills.     He had surgery on his right shoulder on 10/25 after a workplace injury.    He last ate around 1000 this morning.      REVIEW OF SYSTEMS   Review of Systems   SEE HPI    PAST MEDICAL HISTORY:     Past Medical History:   Diagnosis Date    Closed displaced fracture of proximal phalanx of lesser toe of left foot with routine healing 2021    Disease of thyroid gland        PAST SURGICAL HISTORY:     Past Surgical History:   Procedure Laterality Date    ADENOIDECTOMY           CURRENT MEDICATIONS:   hydrochlorothiazide (HYDRODIURIL) 25 MG tablet  levothyroxine (SYNTHROID/LEVOTHROID) 150 MCG tablet         ALLERGIES:     Allergies   Allergen Reactions    Bee Sting Kit [Bee Venom] Unknown    Amlodipine Other (See Comments)     Edema     Lisinopril Cough       FAMILY HISTORY:     Family History   Problem Relation Age of Onset    Cancer Mother         Primary liver cancer    Other Cancer Mother         Liver Cancer    Hepatitis Father     Liver Disease Father         hx hep c       SOCIAL HISTORY:     Social History     Socioeconomic History    Marital status: Single    Number of children: 2   Tobacco Use    Smoking status: Former     Current packs/day: 0.00     Average packs/day: 0.3 packs/day for 10.0 years (2.5 ttl pk-yrs)     Types: Cigarettes     Start date: 2007     Quit date: 2017     Years since quittin.9    Smokeless tobacco: Never   Vaping Use    Vaping  "status: Never Used   Substance and Sexual Activity    Alcohol use: Yes     Comment: 3-4 beers a week    Drug use: No    Sexual activity: Yes     Partners: Female     Birth control/protection: Male Surgical   Other Topics Concern    Parent/sibling w/ CABG, MI or angioplasty before 65F 55M? No   Social History Narrative    , 3 children      Social Drivers of Health     Financial Resource Strain: Low Risk  (9/26/2023)    Financial Resource Strain     Within the past 12 months, have you or your family members you live with been unable to get utilities (heat, electricity) when it was really needed?: No   Food Insecurity: Low Risk  (9/26/2023)    Food Insecurity     Within the past 12 months, did you worry that your food would run out before you got money to buy more?: No     Within the past 12 months, did the food you bought just not last and you didn t have money to get more?: No   Transportation Needs: Low Risk  (9/26/2023)    Transportation Needs     Within the past 12 months, has lack of transportation kept you from medical appointments, getting your medicines, non-medical meetings or appointments, work, or from getting things that you need?: No   Interpersonal Safety: Low Risk  (10/23/2024)    Interpersonal Safety     Do you feel physically and emotionally safe where you currently live?: Yes     Within the past 12 months, have you been hit, slapped, kicked or otherwise physically hurt by someone?: No     Within the past 12 months, have you been humiliated or emotionally abused in other ways by your partner or ex-partner?: No   Housing Stability: Low Risk  (9/26/2023)    Housing Stability     Do you have housing? : Yes     Are you worried about losing your housing?: No       VITALS:   BP (!) 167/98   Pulse 95   Temp 97.6  F (36.4  C) (Oral)   Resp 18   Ht 1.727 m (5' 8\")   Wt 115.2 kg (253 lb 14.4 oz)   SpO2 99%   BMI 38.61 kg/m      PHYSICAL EXAM     Physical Exam  Vitals and nursing note reviewed. " Exam conducted with a chaperone present.   Constitutional:       General: He is not in acute distress.     Appearance: He is obese. He is not ill-appearing, toxic-appearing or diaphoretic.   Abdominal:          Comments: Lower abdominal tenderness palpation   Neurological:      Mental Status: He is alert.         Physical Exam   Constitutional: Well appearing. Non-toxic.    Head: Atraumatic.     Nose: Nose normal.     Mouth/Throat: Oropharynx is clear and moist.     Eyes: EOM are normal. Pupils are equal, round, and reactive to light.     Ears: Bilateral pearly white tympanic membranes.    Neck: Normal range of motion. Neck supple.     Cardiovascular: Normal rate, regular rhythm and normal heart sounds.  2+ femoral pulses/radial/DP pulses B    Pulmonary/Chest: Normal effort  and breath sounds normal.     Abdominal: soft nontender. Protuberant abdomen. Reducible umbilical hernia.    Musculoskeletal: Normal range of motion.  Surgical scar why deltoid area.    Neurological: Moves upper and lower extremities equally.    Lymphatics: no edema, no calves pain, no palpable cords.    : NA    Skin: Skin is warm and dry.  Tattoos    Psychiatric: Normal mood and affect. Behavior is normal.       LAB:     All pertinent labs reviewed and interpreted.  Labs Ordered and Resulted from Time of ED Arrival to Time of ED Departure   BASIC METABOLIC PANEL - Abnormal       Result Value    Sodium 135      Potassium 3.6      Chloride 95 (*)     Carbon Dioxide (CO2) 27      Anion Gap 13      Urea Nitrogen 11.0      Creatinine 1.08      GFR Estimate 90      Calcium 9.0      Glucose 123 (*)    HEPATIC FUNCTION PANEL - Abnormal    Protein Total 8.1      Albumin 4.8      Bilirubin Total 1.7 (*)     Alkaline Phosphatase 91      AST 65 (*)      (*)     Bilirubin Direct 0.42 (*)    ROUTINE UA WITH MICROSCOPIC REFLEX TO CULTURE - Abnormal    Color Urine Orange (*)     Appearance Urine Clear      Glucose Urine Negative      Bilirubin Urine  Negative      Ketones Urine Negative      Specific Gravity Urine 1.024      Blood Urine Negative      pH Urine 6.5      Protein Albumin Urine 20 (*)     Urobilinogen Urine <2.0      Nitrite Urine Negative      Leukocyte Esterase Urine Negative      RBC Urine 1      WBC Urine <1     LIPASE - Normal    Lipase 32     MAGNESIUM - Normal    Magnesium 1.7     CBC WITH PLATELETS AND DIFFERENTIAL    WBC Count 8.3      RBC Count 4.60      Hemoglobin 14.8      Hematocrit 41.9      MCV 91      MCH 32.2      MCHC 35.3      RDW 12.3      Platelet Count 207      % Neutrophils 65      % Lymphocytes 20      % Monocytes 11      % Eosinophils 3      % Basophils 1      % Immature Granulocytes 1      NRBCs per 100 WBC 0      Absolute Neutrophils 5.4      Absolute Lymphocytes 1.6      Absolute Monocytes 0.9      Absolute Eosinophils 0.3      Absolute Basophils 0.1      Absolute Immature Granulocytes 0.0      Absolute NRBCs 0.0     GLUCOSE MONITOR NURSING POCT   PHOSPHORUS   HEMOGLOBIN A1C        RADIOLOGY:     Reviewed all pertinent imaging. Please see official radiology report.  CT Abdomen Pelvis w Contrast   Final Result   IMPRESSION:    1.  Acute appendicitis without abscess.      2.  Marked diffuse hepatic steatosis.      US Abdomen Limited    (Results Pending)        EKG:       I have independently reviewed and interpreted the EKG(s) documented above.      PROCEDURES:     Procedures      I, Kalen Lopez, am serving as a scribe to document services personally performed by Dr. Norman based on my observation and the provider's statements to me. I, Misty Norman MD attest that Kalen Lopez is acting in a scribe capacity, has observed my performance of the services and has documented them in accordance with my direction.    Misty Norman M.D.  Emergency Medicine  [facility] EMERGENCY DEPARTMENT  [facility] [facility] [id number] [phone]  Dept: [phone]        Misty Norman MD  11/24/24 5822

## 2024-11-24 NOTE — ANESTHESIA PREPROCEDURE EVALUATION
Anesthesia Pre-Procedure Evaluation    Patient: Gustavo Becerra   MRN: [id number] : 1985        Procedure : Procedure(s):  APPENDECTOMY, LAPAROSCOPIC          Past Medical History:   Diagnosis Date    Closed displaced fracture of proximal phalanx of lesser toe of left foot with routine healing 2021    Disease of thyroid gland       Past Surgical History:   Procedure Laterality Date    ADENOIDECTOMY        Allergies   Allergen Reactions    Bee Sting Kit [Bee Venom] Unknown    Amlodipine Other (See Comments)     Edema     Lisinopril Cough      Social History     Tobacco Use    Smoking status: Former     Current packs/day: 0.00     Average packs/day: 0.3 packs/day for 10.0 years (2.5 ttl pk-yrs)     Types: Cigarettes     Start date: 2007     Quit date: 2017     Years since quittin.9    Smokeless tobacco: Never   Substance Use Topics    Alcohol use: Yes     Comment: 3-4 beers a week      Wt Readings from Last 1 Encounters:   24 115.2 kg (253 lb 14.4 oz)        Anesthesia Evaluation   Pt has had prior anesthetic. Type: General and Regional.        ROS/MED HX  ENT/Pulmonary:    (-) sleep apnea   Neurologic:  - neg neurologic ROS     Cardiovascular:     (+)  hypertension- -   -  - -                                      METS/Exercise Tolerance:     Hematologic:       Musculoskeletal:       GI/Hepatic: Comment: Denies nausea or vomiting - neg GI/hepatic ROS     Renal/Genitourinary:  - neg Renal ROS     Endo:     (+)          thyroid problem, hypothyroidism,    Obesity,       Psychiatric/Substance Use:  - neg psychiatric ROS     Infectious Disease:       Malignancy:       Other:            Physical Exam    Airway        Mallampati: III   TM distance: > 3 FB   Neck ROM: full   Mouth opening: > 3 cm    Respiratory Devices and Support         Dental       (+) Completely normal teeth      Cardiovascular   cardiovascular exam normal       Rhythm and rate: regular and tachycardia     Pulmonary    "pulmonary exam normal        breath sounds clear to auscultation           OUTSIDE LABS:  CBC:   Lab Results   Component Value Date    WBC 8.3 11/24/2024    HGB 14.8 11/24/2024    HCT 41.9 11/24/2024     11/24/2024     BMP:   Lab Results   Component Value Date     11/24/2024     10/12/2024    POTASSIUM 3.6 11/24/2024    POTASSIUM 4.0 10/12/2024    CHLORIDE 95 (L) 11/24/2024    CHLORIDE 102 10/12/2024    CO2 27 11/24/2024    CO2 24 10/12/2024    BUN 11.0 11/24/2024    BUN 11.0 10/12/2024    CR 1.08 11/24/2024    CR 1.11 10/12/2024     (H) 11/24/2024    GLC 96 10/12/2024     COAGS: No results found for: \"PTT\", \"INR\", \"FIBR\"  POC: No results found for: \"BGM\", \"HCG\", \"HCGS\"  HEPATIC:   Lab Results   Component Value Date    ALBUMIN 4.8 11/24/2024    PROTTOTAL 8.1 11/24/2024     (H) 11/24/2024    AST 65 (H) 11/24/2024    ALKPHOS 91 11/24/2024    BILITOTAL 1.7 (H) 11/24/2024     OTHER:   Lab Results   Component Value Date    A1C 5.4 11/24/2024    DMITRY 9.0 11/24/2024    PHOS 3.2 11/24/2024    MAG 1.7 11/24/2024    LIPASE 32 11/24/2024    TSH 13.90 (H) 10/12/2024    T4 1.25 10/12/2024    T3 80 08/27/2018       Anesthesia Plan    ASA Status:  3, emergent    NPO Status:  NPO Appropriate    Anesthesia Type: General.     - Airway: ETT   Induction: RSI.   Maintenance: Balanced.        Consents    Anesthesia Plan(s) and associated risks, benefits, and realistic alternatives discussed. Questions answered and patient/representative(s) expressed understanding.     - Discussed: Risks, Benefits and Alternatives for BOTH SEDATION and the PROCEDURE were discussed     - Discussed with:  Patient      - Extended Intubation/Ventilatory Support Discussed: No.      - Patient is DNR/DNI Status: No     Use of blood products discussed: No .     Postoperative Care    Pain management: IV analgesics, Oral pain medications.   PONV prophylaxis: Ondansetron (or other 5HT-3), Dexamethasone or Solumedrol     Comments:     " "          Jie Garcia MD    I have reviewed the pertinent notes and labs in the chart from the past 30 days and (re)examined the patient.  Any updates or changes from those notes are reflected in this note.      # Hypochloremia: Lowest Cl = 95 mmol/L in last 2 days, will monitor as appropriate          # Hypertension: Noted on problem list           # Obesity: Estimated body mass index is 38.61 kg/m  as calculated from the following:    Height as of this encounter: 1.727 m (5' 8\").    Weight as of this encounter: 115.2 kg (253 lb 14.4 oz).             "

## 2024-11-24 NOTE — CONSULTS
General Surgery Consultation  Gustavo Becerra MRN# [id number]   Age/Sex: 39 year old male YOB: 1985     Reason for consult: Abdominal pain       Requesting physician: Dr. Norman                   Assessment and Plan:   Assessment:  1.  Acute appendicitis  2.  Umbilical hernia  3.  Morbid obesity  4.  Hepatic steatosis    39-year-old male presenting with acute appendicitis.  I explained to the patient that we will proceed with the laparoscopic appendectomy.  In regards to the umbilical hernia, I do not recommend formally repairing the umbilical hernia at this time with mesh.  With the infection from the acute appendicitis, I would not recommend putting in mesh.    Plan:  -Patient to the OR today for laparoscopic appendectomy  -Patient can follow-up with me as an outpatient for umbilical hernia repair  -Patient to be admitted to medicine due to medical comorbidities  -N.p.o. and start Zosyn          Chief Complaint:     Chief Complaint   Patient presents with    Abdominal Pain        History is obtained from the patient    HPI:   Gustavo Becerra is a 39 year old male who presents ED with complaints of abdominal pain.  Patient was worked up and found to have acute appendicitis.  General surgery team asked to eval the patient due to the appendicitis.  Patient states that abdominal pain is now located over the right lower quadrant of the abdomen.  Patient had no fevers no chills.  For some he is ever had it.  He also noticed that he had an umbilical hernia.  Otherwise, patient has no further complaints at this time.          Past Medical History:     Past Medical History:   Diagnosis Date    Closed displaced fracture of proximal phalanx of lesser toe of left foot with routine healing 12/08/2021    Disease of thyroid gland               Past Surgical History:     Past Surgical History:   Procedure Laterality Date    ADENOIDECTOMY               Social History:    reports that he quit smoking about 7  "years ago. His smoking use included cigarettes. He started smoking about 17 years ago. He has a 2.5 pack-year smoking history. He has never used smokeless tobacco. He reports current alcohol use. He reports that he does not use drugs.           Family History:     Family History   Problem Relation Age of Onset    Cancer Mother         Primary liver cancer    Other Cancer Mother         Liver Cancer    Hepatitis Father     Liver Disease Father         hx hep c              Allergies:     Allergies   Allergen Reactions    Bee Sting Kit [Bee Venom] Unknown    Amlodipine Other (See Comments)     Edema     Lisinopril Cough              Medications:     Prior to Admission medications    Medication Sig Start Date End Date Taking? Authorizing Provider   hydrochlorothiazide (HYDRODIURIL) 25 MG tablet Take 1 tablet (25 mg) by mouth daily. 10/30/24  Yes Jaclyn López NP   hydrOXYzine HCl (ATARAX) 25 MG tablet Take 1-2 tablets by mouth every 6 hours as needed. 10/25/24  Yes Reported, Patient   ibuprofen (ADVIL/MOTRIN) 800 MG tablet Take 800 mg by mouth every 8 hours as needed. 10/25/24  Yes Reported, Patient   levothyroxine (SYNTHROID/LEVOTHROID) 150 MCG tablet TAKE 1 TABLET(150 MCG) BY MOUTH DAILY 11/18/24  Yes Jaclyn López NP   Melatonin 10 MG TABS tablet Take 10 mg by mouth nightly as needed for sleep.   Yes Reported, Patient              Review of Systems:   The Review of Systems is negative other than noted in the HPI            Physical Exam:   Patient Vitals for the past 24 hrs:   BP Temp Temp src Pulse Resp SpO2 Height Weight   11/24/24 1230 (!) 167/98 -- -- 95 18 99 % -- --   11/24/24 1117 (!) 178/120 97.6  F (36.4  C) Oral 98 20 99 % 1.727 m (5' 8\") 115.2 kg (253 lb 14.4 oz)        No intake or output data in the 24 hours ending 11/24/24 1505   Constitutional:   awake, alert, cooperative, no apparent distress, and appears stated age       Eyes:   PERRL, conjunctiva/corneas clear, EOM's intact; no scleral edema or " icterus noted        ENT:   Normocephalic, without obvious abnormality, atraumatic, Lips, mucosa, and tongue normal        Hematologic / Lymphatic:   No lymphadenopathy       Lungs:   Normal respiratory effort, no accessory muscle use       Cardiovascular:   Regular rate and rhythm       Abdomen:   Abdomen soft, tender to palpation right lower quadrant.  Obese abdomen.  Patient also has umbilical hernia that is reducible.       Musculoskeletal:   No obvious swelling, bruising or deformity       Skin:   Skin color and texture normal for patient, no rashes or lesions              Data:        All imaging studies reviewed by me.  I personally reviewed the imagings and agree with acute appendicitis with reducible umbilical hernia.      Vladimir Bearden DO  General Surgeon  [facility]  Surgery [facility] - [address] [address]?  Office: [phone]  Employed by - [facility]  Pager: [phone]

## 2024-11-24 NOTE — ED TRIAGE NOTES
Abdominal pain started mid morning yesterday. Constant pressure pain. Appears may have new umbilical hernia. Pain seems to be where the bulge is. Recent shoulder surgery. Loose stool this morning. Dark urine this morning. Has not been working recently due to the surgery, but normally heavy lifting for work.

## 2024-11-25 VITALS
HEIGHT: 68 IN | HEART RATE: 92 BPM | SYSTOLIC BLOOD PRESSURE: 164 MMHG | DIASTOLIC BLOOD PRESSURE: 90 MMHG | RESPIRATION RATE: 18 BRPM | BODY MASS INDEX: 38.48 KG/M2 | TEMPERATURE: 98.2 F | WEIGHT: 253.9 LBS | OXYGEN SATURATION: 96 %

## 2024-11-25 PROBLEM — Z86.79 HISTORY OF HYPERTENSION: Status: ACTIVE | Noted: 2024-11-25

## 2024-11-25 PROBLEM — K76.0 HEPATIC STEATOSIS: Status: ACTIVE | Noted: 2024-11-25

## 2024-11-25 LAB
ALBUMIN SERPL BCG-MCNC: 4.9 G/DL (ref 3.5–5.2)
ALP SERPL-CCNC: 93 U/L (ref 40–150)
ALT SERPL W P-5'-P-CCNC: 81 U/L (ref 0–70)
ANION GAP SERPL CALCULATED.3IONS-SCNC: 13 MMOL/L (ref 7–15)
AST SERPL W P-5'-P-CCNC: 53 U/L (ref 0–45)
BILIRUB DIRECT SERPL-MCNC: 0.28 MG/DL (ref 0–0.3)
BILIRUB SERPL-MCNC: 1 MG/DL
BUN SERPL-MCNC: 8.7 MG/DL (ref 6–20)
CALCIUM SERPL-MCNC: 8.6 MG/DL (ref 8.8–10.4)
CHLORIDE SERPL-SCNC: 95 MMOL/L (ref 98–107)
CREAT SERPL-MCNC: 1.08 MG/DL (ref 0.67–1.17)
EGFRCR SERPLBLD CKD-EPI 2021: 90 ML/MIN/1.73M2
ERYTHROCYTE [DISTWIDTH] IN BLOOD BY AUTOMATED COUNT: 12.3 % (ref 10–15)
GLUCOSE BLDC GLUCOMTR-MCNC: 134 MG/DL (ref 70–99)
GLUCOSE SERPL-MCNC: 138 MG/DL (ref 70–99)
HCO3 SERPL-SCNC: 25 MMOL/L (ref 22–29)
HCT VFR BLD AUTO: 40.6 % (ref 40–53)
HGB BLD-MCNC: 13.6 G/DL (ref 13.3–17.7)
MAGNESIUM SERPL-MCNC: 2.4 MG/DL (ref 1.7–2.3)
MCH RBC QN AUTO: 31.7 PG (ref 26.5–33)
MCHC RBC AUTO-ENTMCNC: 33.5 G/DL (ref 31.5–36.5)
MCV RBC AUTO: 95 FL (ref 78–100)
PHOSPHATE SERPL-MCNC: 3.8 MG/DL (ref 2.5–4.5)
PLATELET # BLD AUTO: 202 10E3/UL (ref 150–450)
POTASSIUM SERPL-SCNC: 4.1 MMOL/L (ref 3.4–5.3)
PROT SERPL-MCNC: 7.9 G/DL (ref 6.4–8.3)
RBC # BLD AUTO: 4.29 10E6/UL (ref 4.4–5.9)
SODIUM SERPL-SCNC: 133 MMOL/L (ref 135–145)
WBC # BLD AUTO: 8.2 10E3/UL (ref 4–11)

## 2024-11-25 PROCEDURE — 36415 COLL VENOUS BLD VENIPUNCTURE: CPT | Performed by: SURGERY

## 2024-11-25 PROCEDURE — 82248 BILIRUBIN DIRECT: CPT | Performed by: SURGERY

## 2024-11-25 PROCEDURE — G0378 HOSPITAL OBSERVATION PER HR: HCPCS

## 2024-11-25 PROCEDURE — 84100 ASSAY OF PHOSPHORUS: CPT | Performed by: SURGERY

## 2024-11-25 PROCEDURE — 250N000013 HC RX MED GY IP 250 OP 250 PS 637: Performed by: HOSPITALIST

## 2024-11-25 PROCEDURE — 250N000013 HC RX MED GY IP 250 OP 250 PS 637

## 2024-11-25 PROCEDURE — 250N000011 HC RX IP 250 OP 636: Performed by: SURGERY

## 2024-11-25 PROCEDURE — 83735 ASSAY OF MAGNESIUM: CPT | Performed by: SURGERY

## 2024-11-25 PROCEDURE — 80053 COMPREHEN METABOLIC PANEL: CPT | Performed by: SURGERY

## 2024-11-25 PROCEDURE — 99024 POSTOP FOLLOW-UP VISIT: CPT

## 2024-11-25 PROCEDURE — 250N000013 HC RX MED GY IP 250 OP 250 PS 637: Performed by: SURGERY

## 2024-11-25 PROCEDURE — 96376 TX/PRO/DX INJ SAME DRUG ADON: CPT

## 2024-11-25 PROCEDURE — 96361 HYDRATE IV INFUSION ADD-ON: CPT

## 2024-11-25 PROCEDURE — 99239 HOSP IP/OBS DSCHRG MGMT >30: CPT | Performed by: HOSPITALIST

## 2024-11-25 PROCEDURE — 85027 COMPLETE CBC AUTOMATED: CPT | Performed by: SURGERY

## 2024-11-25 RX ORDER — ACETAMINOPHEN 325 MG/1
650 TABLET ORAL EVERY 4 HOURS PRN
Status: DISCONTINUED | OUTPATIENT
Start: 2024-11-25 | End: 2024-11-25 | Stop reason: HOSPADM

## 2024-11-25 RX ORDER — ACETAMINOPHEN 325 MG/1
650 TABLET ORAL EVERY 4 HOURS PRN
COMMUNITY
Start: 2024-11-25

## 2024-11-25 RX ORDER — HYDROCODONE BITARTRATE AND ACETAMINOPHEN 5; 325 MG/1; MG/1
2 TABLET ORAL EVERY 6 HOURS PRN
Status: DISCONTINUED | OUTPATIENT
Start: 2024-11-25 | End: 2024-11-25 | Stop reason: HOSPADM

## 2024-11-25 RX ORDER — HYDROCODONE BITARTRATE AND ACETAMINOPHEN 5; 325 MG/1; MG/1
1 TABLET ORAL EVERY 6 HOURS PRN
Status: DISCONTINUED | OUTPATIENT
Start: 2024-11-25 | End: 2024-11-25 | Stop reason: HOSPADM

## 2024-11-25 RX ORDER — AMOXICILLIN 250 MG
2 CAPSULE ORAL 2 TIMES DAILY
Status: DISCONTINUED | OUTPATIENT
Start: 2024-11-25 | End: 2024-11-25 | Stop reason: HOSPADM

## 2024-11-25 RX ADMIN — SENNOSIDES AND DOCUSATE SODIUM 2 TABLET: 8.6; 5 TABLET ORAL at 08:23

## 2024-11-25 RX ADMIN — HYDROCHLOROTHIAZIDE 25 MG: 25 TABLET ORAL at 08:23

## 2024-11-25 RX ADMIN — HYDROCODONE BITARTRATE AND ACETAMINOPHEN 2 TABLET: 5; 325 TABLET ORAL at 08:22

## 2024-11-25 RX ADMIN — LEVOTHYROXINE SODIUM 150 MCG: 0.03 TABLET ORAL at 06:15

## 2024-11-25 RX ADMIN — PIPERACILLIN AND TAZOBACTAM 3.38 G: 3; .375 INJECTION, POWDER, FOR SOLUTION INTRAVENOUS at 04:09

## 2024-11-25 RX ADMIN — HYDROMORPHONE HYDROCHLORIDE 0.4 MG: 0.2 INJECTION, SOLUTION INTRAMUSCULAR; INTRAVENOUS; SUBCUTANEOUS at 01:57

## 2024-11-25 RX ADMIN — HYDROMORPHONE HYDROCHLORIDE 0.4 MG: 0.2 INJECTION, SOLUTION INTRAMUSCULAR; INTRAVENOUS; SUBCUTANEOUS at 06:28

## 2024-11-25 RX ADMIN — OXYCODONE HYDROCHLORIDE 10 MG: 5 TABLET ORAL at 00:47

## 2024-11-25 RX ADMIN — ACETAMINOPHEN 650 MG: 325 TABLET ORAL at 11:48

## 2024-11-25 RX ADMIN — HYDROMORPHONE HYDROCHLORIDE 0.4 MG: 0.2 INJECTION, SOLUTION INTRAMUSCULAR; INTRAVENOUS; SUBCUTANEOUS at 04:18

## 2024-11-25 RX ADMIN — ACETAMINOPHEN 975 MG: 325 TABLET ORAL at 06:14

## 2024-11-25 RX ADMIN — POLYETHYLENE GLYCOL 3350 17 G: 17 POWDER, FOR SOLUTION ORAL at 08:22

## 2024-11-25 ASSESSMENT — ACTIVITIES OF DAILY LIVING (ADL)
ADLS_ACUITY_SCORE: 16
ADLS_ACUITY_SCORE: 0
ADLS_ACUITY_SCORE: 16
ADLS_ACUITY_SCORE: 0

## 2024-11-25 NOTE — ANESTHESIA POSTPROCEDURE EVALUATION
Patient: Gustavo Becerra    Procedure: Procedure(s):  APPENDECTOMY, LAPAROSCOPIC       Anesthesia Type:  General    Note:  Disposition: Inpatient   Postop Pain Control: Uneventful            Sign Out: Well controlled pain   PONV:    Neuro/Psych: Uneventful            Sign Out: Acceptable/Baseline neuro status   Airway/Respiratory: Uneventful            Sign Out: Acceptable/Baseline resp. status   CV/Hemodynamics: Uneventful            Sign Out: Acceptable CV status; No obvious hypovolemia; No obvious fluid overload   Other NRE: NONE   DID A NON-ROUTINE EVENT OCCUR? No           Last vitals:  Vitals Value Taken Time   /87 11/24/24 2230   Temp 36.9  C (98.4  F) 11/24/24 2215   Pulse 113 11/24/24 2229   Resp 16 11/24/24 2215   SpO2 96 % 11/24/24 2229   Vitals shown include unfiled device data.    Electronically Signed By: Jie Garcia MD  November 25, 2024  3:00 AM

## 2024-11-25 NOTE — ANESTHESIA CARE TRANSFER NOTE
Patient: Gustavo Becerra    Procedure: Procedure(s):  APPENDECTOMY, LAPAROSCOPIC       Diagnosis: Acute appendicitis, unspecified acute appendicitis type [K35.80]  Diagnosis Additional Information: No value filed.    Anesthesia Type:   General     Note:    Oropharynx: oropharynx clear of all foreign objects  Level of Consciousness: awake  Oxygen Supplementation: face mask    Independent Airway: airway patency satisfactory and stable  Dentition: dentition unchanged  Vital Signs Stable: post-procedure vital signs reviewed and stable  Report to RN Given: handoff report given  Patient transferred to: PACU    Handoff Report: Identifed the Patient, Identified the Reponsible Provider, Reviewed the pertinent medical history, Discussed the surgical course, Reviewed Intra-OP anesthesia mangement and issues during anesthesia, Set expectations for post-procedure period and Allowed opportunity for questions and acknowledgement of understanding      Vitals:  Vitals Value Taken Time   /108 11/24/24 2130   Temp     Pulse 103 11/24/24 2130   Resp 15 11/24/24 2130   SpO2 100 % 11/24/24 2130   Vitals shown include unfiled device data.    Electronically Signed By: JAMES Starkey CRNA  November 24, 2024  9:32 PM

## 2024-11-25 NOTE — DISCHARGE INSTRUCTIONS
From your Surgeon:    Follow up:  For straightforward laparoscopic procedures, our practice is to check-in with you over the phone a few days after your procedure.  If you would like a scheduled follow up appointment in clinic, please call us at [phone] to schedule an appointment at your convenience.  If you would prefer to follow up with us further by phone, please let us know so that we may contact you 2-3 weeks following your procedure.        Diet: Regular diet. Patients can have difficulty with constipation following surgery, due in part to the administration of narcotic pain medications.  If you are suffering with constipation, you should avoid foods such as hard cheeses or red meat.  Foods high in fiber are recommended.      Activity: You should continue to be active at home, including getting up and walking frequently.  If possible, limit the amount of time spent in bed.    Restrictions: You should not lift greater than 20 pounds for 2 weeks, and will want to avoid strenuous physical activity for 1-2 weeks.  You should limit your physical activity if it causes you discomfort; however, this should resolve within 1-2 weeks.   Walking does not count as strenuous physical activity and you are safe to walk up and down stairs.  Following 2 weeks you may resume normal physical activity.    Work:  You can return to work once your surgical pain has resolved.  If you perform duties that require lifting, pushing or pulling anything greater than 15 pounds, you should be on light duty for the immediate 2 weeks after your surgery (if your work allows light duty).  After 2 weeks, you can return to work without restrictions.    Wound care: Your wounds are covered with Dermabond which is a waterproof skin glue. This will peel off on its own after 14 days.  It is normal to have a small rim of red present around the incisions. This should not, however, extend beyond 1/4 inch from the incision.  If your incisions become  increasingly tender, red, or draining, please contact us.       Bathing: You may shower after 24 hours from surgery.  It is ok to get your incisions wet, but avoid rubbing them.  Avoid soaking in bath tubs or swimming in lakes, pools, or streams for 2 weeks following surgery.

## 2024-11-25 NOTE — PLAN OF CARE
Problem: Surgery Nonspecified  Goal: Optimal Pain Control and Function  Intervention: Prevent or Manage Pain  Recent Flowsheet Documentation  Taken 11/25/2024 0418 by No Galaviz RN  Pain Management Interventions: medication (see MAR)  Taken 11/25/2024 0047 by No Galaviz RN  Pain Management Interventions:   medication (see MAR)   cold applied   emotional support  Taken 11/24/2024 2350 by No Galaviz RN  Pain Management Interventions:   medication (see MAR)   emotional support   cold applied   Goal Outcome Evaluation:       Patient is A/O x4, pain controlled with scheduled tylenol and prn dilaudid and oxy (See MAR). Ambulate to the bathroom with good output, IV fluid  and Zosyn running per order. On clear liquid diet to advance as tolerated.   Vital signs:  Temp: 98.7  F (37.1  C) Temp src: Oral BP: (!) 142/95 Pulse: 93   Resp: 16 SpO2: 96 % O2 Device: None (Room air) , except elevated Bp,  parameter for prn hydralazine not met. Continue to monitor.             Patient/Caregiver provided printed discharge information.

## 2024-11-25 NOTE — ANESTHESIA PROCEDURE NOTES
Airway       Patient location during procedure: OR       Procedure Start/Stop Times: 11/24/2024 8:30 PM  Staff -        Anesthesiologist:  Jie Garcia MD       CRNA: Td Reed APRN CRNA       Performed By: CRNA  Consent for Airway        Urgency: elective  Indications and Patient Condition       Indications for airway management: robert-procedural       Induction type:intravenous       Mask difficulty assessment: 2 - vent by mask + OA or adjuvant +/- NMBA    Final Airway Details       Final airway type: endotracheal airway       Successful airway: ETT - single  Endotracheal Airway Details        ETT size (mm): 8.0       Cuffed: yes       Successful intubation technique: direct laryngoscopy       DL Blade Type: Hagen 2       Grade View of Cords: 1       Adjucts: stylet       Position: Right       Measured from: lips       Secured at (cm): 23       Bite block used: None    Post intubation assessment        Placement verified by: capnometry, equal breath sounds and chest rise        Number of attempts at approach: 1       Number of other approaches attempted: 0       Secured with: tape       Ease of procedure: easy       Dentition: Unchanged    Medication(s) Administered   Medication Administration Time: 11/24/2024 8:30 PM

## 2024-11-25 NOTE — PROVIDER NOTIFICATION
PROVIDER NOTIFIED: Dr. Bearden    METHOD: answering service [id number]    REASON:IVF clarification    OUTCOME: discontinue LR and continue NS

## 2024-11-25 NOTE — DISCHARGE SUMMARY
"[facility]  Hospitalist Discharge Summary      Date of Admission:  11/24/2024  Date of Discharge:  11/25/2024  Discharging Provider: Gaurang Guzmán DO  Discharge Service: Hospitalist Service    Discharge Diagnoses   Acute appendicitis  Hypokalemia  Hypomagnesemia  Transaminitis  Hepatic steatosis  Hypertension  Hypothyroidism      Clinically Significant Risk Factors     # Obesity: Estimated body mass index is 38.61 kg/m  as calculated from the following:    Height as of this encounter: 1.727 m (5' 8\").    Weight as of this encounter: 115.2 kg (253 lb 14.4 oz).       Follow-ups Needed After Discharge   Follow-up Appointments       Follow Up      Follow up with Surgery, within 2 weeks. to evaluate after surgery.        Hospital Follow-up with Existing Primary Care Provider (PCP)      Please see details below         Schedule Primary Care visit within: 30 Days               Unresulted Labs Ordered in the Past 30 Days of this Admission       Date and Time Order Name Status Description    11/24/2024  9:06 PM Surgical Pathology Exam In process         These results will be followed up by [facility]    Discharge Disposition   Discharged to home  Condition at discharge: Stable    Hospital Course   Patient is a 39-year-old male with HTN who presented with abdominal pain and found to have elevated LFTs and acute appendicitis without abscess.  Started on IV Zosyn and electrolytes replaced.  General surgery consulted and patient was taken to the OR last night and underwent laparoscopic appendectomy.  Has done well postoperatively.  Tolerating oral intake without nausea or vomiting.    Consultations This Hospital Stay   SURGERY GENERAL IP CONSULT    Code Status   Full Code    Time Spent on this Encounter   I, Gaurang Guzmán DO, personally saw the patient today and spent greater than 30 minutes discharging this patient.       Gaurang Guzmán DO  [facility] [address]  [address] " [facility] [address]  Phone: [phone]  Fax: [phone]  ______________________________________________________________________    Physical Exam   Vital Signs: Temp: 98.2  F (36.8  C) Temp src: Oral BP: (!) 164/90 Pulse: 92   Resp: 18 SpO2: 96 % O2 Device: None (Room air) Oxygen Delivery: 2 LPM  Weight: 253 lbs 14.4 oz  General Appearance:  No acute distress  Respiratory: Clear to auscultation bilaterally  Cardiovascular: Regular rate and rhythm  GI: Normal bowel sounds, abdomen is soft, mildly distended with no rebound  Neuro: Alert and oriented x 3, normal speech       Primary Care Physician   Jaclyn López    Discharge Orders      Reason for your hospital stay    Acute appendicitis     Activity    Your activity upon discharge: activity as tolerated, avoid heavy lifting for 2 weeks     Follow Up    Follow up with Surgery, within 2 weeks. to evaluate after surgery.     Diet    Regular diet     Hospital Follow-up with Existing Primary Care Provider (PCP)    Please see details below            Significant Results and Procedures   Most Recent 3 CBC's:  Recent Labs   Lab Test 11/25/24  0613 11/24/24  1140   WBC 8.2 8.3   HGB 13.6 14.8   MCV 95 91    207     Most Recent 3 BMP's:  Recent Labs   Lab Test 11/25/24  0743 11/25/24  0613 11/24/24  1828 11/24/24  1140 10/12/24  1043   NA  --  133*  --  135 140   POTASSIUM  --  4.1 3.5 3.6 4.0   CHLORIDE  --  95*  --  95* 102   CO2  --  25  --  27 24   BUN  --  8.7  --  11.0 11.0   CR  --  1.08  --  1.08 1.11   ANIONGAP  --  13  --  13 14   DMITRY  --  8.6*  --  9.0 9.0   * 138*  --  123* 96     Most Recent 2 LFT's:  Recent Labs   Lab Test 11/25/24  0613 11/24/24  1140   AST 53* 65*   ALT 81* 102*   ALKPHOS 93 91   BILITOTAL 1.0 1.7*     Most Recent TSH and T4:  Recent Labs   Lab Test 10/12/24  1043   TSH 13.90*   T4 1.25     Most Recent Hemoglobin A1c:  Recent Labs   Lab Test 11/24/24  1140   A1C 5.4   ,   Results for orders placed or performed during  the hospital encounter of 11/24/24   CT Abdomen Pelvis w Contrast    Narrative    EXAM: CT ABDOMEN PELVIS W CONTRAST  LOCATION: [facility]  DATE: 11/24/2024    INDICATION: Abdominal pain umbilical hernia bilaterally evaluate for appendicitis diverticulitis obstruction.  COMPARISON: None.  TECHNIQUE: CT scan of the abdomen and pelvis was performed following injection of IV contrast. Multiplanar reformats were obtained. Dose reduction techniques were used.  CONTRAST: isovue 370 90ml    FINDINGS:   LOWER CHEST: Normal.    HEPATOBILIARY: Marked diffuse hepatic steatosis.    PANCREAS: Normal.    SPLEEN: Normal.    ADRENAL GLANDS: Normal.    KIDNEYS/BLADDER: Normal.    BOWEL: Mild Acute appendicitis without complication. No abscess. No free air.    LYMPH NODES: Normal.    VASCULATURE: Normal.    PELVIC ORGANS: Normal.    MUSCULOSKELETAL: Normal.      Impression    IMPRESSION:   1.  Acute appendicitis without abscess.    2.  Marked diffuse hepatic steatosis.   US Abdomen Limited    Narrative    EXAM: US ABDOMEN LIMITED  LOCATION: [facility]  DATE: 11/24/2024    INDICATION: abnormal LFT  COMPARISON: None.  TECHNIQUE: Limited abdominal ultrasound.    FINDINGS:    GALLBLADDER: Normal. No gallstones, wall thickening, or pericholecystic fluid. Negative sonographic Eldridge's sign.    BILE DUCTS: No biliary dilatation. The common duct measures 5 mm.    LIVER: Increased echogenicity from diffuse fatty infiltration. No focal mass.    RIGHT KIDNEY: No hydronephrosis.    PANCREAS: The pancreas is largely obscured by overlying gas.    No ascites.      Impression    IMPRESSION:  Echogenic and attenuating hepatic parenchyma consistent with diffuse hepatocellular disease, most commonly steatosis.           Discharge Medications   Current Discharge Medication List        START taking these medications    Details   docusate sodium (COLACE) 100 MG capsule Take 1 capsule (100 mg) by mouth 2  times daily.  Qty: 30 capsule, Refills: 0    Associated Diagnoses: Acute appendicitis, unspecified acute appendicitis type      HYDROcodone-acetaminophen (NORCO) 5-325 MG tablet Take 1 tablet by mouth every 6 hours as needed for pain.  Qty: 10 tablet, Refills: 0    Associated Diagnoses: Acute appendicitis, unspecified acute appendicitis type           CONTINUE these medications which have NOT CHANGED    Details   hydrochlorothiazide (HYDRODIURIL) 25 MG tablet Take 1 tablet (25 mg) by mouth daily.  Qty: 90 tablet, Refills: 0    Associated Diagnoses: Primary hypertension      hydrOXYzine HCl (ATARAX) 25 MG tablet Take 1-2 tablets by mouth every 6 hours as needed.      ibuprofen (ADVIL/MOTRIN) 800 MG tablet Take 800 mg by mouth every 8 hours as needed.      levothyroxine (SYNTHROID/LEVOTHROID) 150 MCG tablet TAKE 1 TABLET(150 MCG) BY MOUTH DAILY  Qty: 90 tablet, Refills: 3    Associated Diagnoses: Postablative hypothyroidism      Melatonin 10 MG TABS tablet Take 10 mg by mouth nightly as needed for sleep.           Allergies   Allergies   Allergen Reactions    Bee Sting Kit [Bee Venom] Unknown    Amlodipine Other (See Comments)     Edema     Lisinopril Cough

## 2024-11-25 NOTE — OP NOTE
[facility]    Operative Note    Pre-operative diagnosis: Acute appendicitis, unspecified acute appendicitis type [K35.80]  Post-operative diagnosis Same as pre-operative diagnosis    Procedure: APPENDECTOMY, LAPAROSCOPIC, N/A - Abdomen    Surgeon: Surgeons and Role:     * Vladimir Bearden DO - Primary  Anesthesia: General   Estimated Blood Loss: 5 mL     Drains: None  Specimens:   ID Type Source Tests Collected by Time Destination   1 : APPENDIX Tissue Appendix SURGICAL PATHOLOGY EXAM Vladimir Bearden DO 11/24/2024  9:06 PM      Findings:     -Acute appendicitis.    Complications: None.  Implants: * No implants in log *    Indication: 39-year-old male presenting with acute appendicitis.  After evaluation offered patient procedure of laparoscopic appendectomy.  The risks and benefits of the procedure were explained detail to the patient. The risks include infection, bleeding, damage to the surrounding structures. Patient verbalized understanding provided consent to undergo the procedure above.      Procedure: The patient was brought to the operating room and placed on the operative table in a supine position.  Patient had bilateral upper extremities tucked and padded in the usual fashion.  Patient underwent sedation and intubation by the anesthesia team.  The patient then had his abdomen and draped in the usual sterile fashion.  Prior to initiating the procedure, a timeout was completed.  All present were in agreement.    1% lidocaine with epinephrine was instilled in Corbett's point in the left upper quadrant.  An 11 blade was then used to make a small skin incision.  The Veress needle was then inserted into the patient's abdomen.  A saline drop test was then completed.  Insufflation was then initiated.  A 5 mm port was then inserted infraumbilically using a Visiport.  A general survey was completed.  I could identify that the patient had acute appendicitis.      A 5 mm port was then placed in the  suprapubic region.  A 12 mm port was placed in the left lower quadrant.  Both ports were placed under direct visualization.    The appendix was identified and grasped with blunt graspers.  The mesoappendix was  from the appendix using ligature.  The mesoappendix was taken all the way down to the base of the appendix.  A 45 blue stapler was used to transect across the appendix at the base.  Visualization of the appendiceal stump showed that it was intact and there was no bleeding.  The appendix was removed through the 12 mm port using an Endo Catch bag.     The fascia of the 12 mm port was then closed using an 0 Vicryl stitch with a suture passer.  A 3-0 Vicryl stitch was used to perform deep dermal sutures at the infraumbilical port site.  All surgical sites were then closed using a 4-0 Vicryl stitch in a subcuticular fashion.  Surgical glue was then used to reinforce all surgical sites.  At the end of the procedure a final count was completed.  I was told that all sharps, sponges, instruments were accounted for.        Vladimir Bearden DO  General Surgeon  [facility]  Surgery [facility] - [address] [address]?  Office: [phone]  Employed by - [employer]  Pager: [phone]  Cell: [phone]

## 2024-11-25 NOTE — PROGRESS NOTES
General Surgery Progress Note:    Hospital Day # 1    ASSESSMENT:  1. Acute appendicitis, unspecified acute appendicitis type    2. Acute appendicitis with localized peritonitis, without perforation, abscess, or gangrene    3. History of hypertension    4. Hepatic steatosis        Gustavo Becerra is a 39 year old male who is s/p laparoscopic appendectomy on 11/24/24. Patient is progressing well postoperatively and pain is well managed, tolerating clear liquid diet and passing flatus and voiding appropriately. Tachycardic yesterday but generally 90-100s this morning and vitally stable. Discussion regarding discharge recommendations and patient okay for discharge today per surgical perspective.    PLAN:  -ADAT  -Activity as tolerated and encouraged  -Given work note for home  -Recommend continuing bowel regimen at home  -Okay to discharge from surgical standpoint if deemed medically appropriate.  -Discharge recommendations involve instructions placed in AVS.  -Follow up PRN  -Medical management per primary team    SUBJECTIVE:   Gustavo Becerra was seen on rounds. States he is doing well, pain is well controlled overnight and he has voided and passed gas. No BM yet. Tolerated clear liquid diet yesterday and jello this morning. Denies fever, chills, nausea, vomiting. Ambulating well. Feels comfortable going home. Discussion regarding recommendations postoperatively.    Patient had right bicep restrictions already and is seeing ortho outpatient for this already - has lifting restrictions in place already that will last for the next few months likely. 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      Patient Vitals for the past 24 hrs:   BP Temp Temp src Pulse Resp SpO2 Height Weight   11/25/24 0743 (!) 164/90 98.2  F (36.8  C) Oral 92 18 96 % -- --   11/25/24 0556 (!) 142/95 98.7  F (37.1  C) Oral 93 16 96 % -- --   11/25/24 0204 (!) 134/96 98.4  F (36.9  C) Oral 106 18 93 % -- --   11/25/24 0100 136/81 98.3  F (36.8  C) Oral 104  "20 93 % -- --   11/24/24 2330 (!) 144/93 98.7  F (37.1  C) Oral 112 20 94 % -- --   11/24/24 2300 (!) 165/83 98.9  F (37.2  C) Oral 113 18 95 % -- --   11/24/24 2242 (!) 165/83 98.5  F (36.9  C) Oral (!) 121 18 92 % -- --   11/24/24 2215 (!) 163/78 98.4  F (36.9  C) Temporal 108 16 96 % -- --   11/24/24 2200 (!) 146/86 -- -- 104 16 96 % -- --   11/24/24 2153 -- -- -- 102 15 97 % -- --   11/24/24 2145 (!) 173/93 -- -- 102 12 97 % -- --   11/24/24 2130 (!) 175/108 97.8  F (36.6  C) -- 103 15 100 % -- --   11/24/24 1739 (!) 156/112 98.7  F (37.1  C) Oral 114 18 98 % -- --   11/24/24 1230 (!) 167/98 -- -- 95 18 99 % -- --   11/24/24 1117 (!) 178/120 97.6  F (36.4  C) Oral 98 20 99 % 1.727 m (5' 8\") 115.2 kg (253 lb 14.4 oz)         PHYSICAL EXAM:  General: patient seen resting in bed in no acute distress  Resp: no increased work of breathing, breathing comfortably on room air  Abdomen: generally soft distended abdomen with incisions clean, dry, intact  Extremities: No edema or cyanosis visualized on exam, no obvious deformities    11/24 0700 - 11/25 0659  In: 1290 [P.O.:340; I.V.:950]  Out: 800 [Urine:800]    Admission on 11/24/2024   Component Date Value    Sodium 11/24/2024 135     Potassium 11/24/2024 3.6     Chloride 11/24/2024 95 (L)     Carbon Dioxide (CO2) 11/24/2024 27     Anion Gap 11/24/2024 13     Urea Nitrogen 11/24/2024 11.0     Creatinine 11/24/2024 1.08     GFR Estimate 11/24/2024 90     Calcium 11/24/2024 9.0     Glucose 11/24/2024 123 (H)     Protein Total 11/24/2024 8.1     Albumin 11/24/2024 4.8     Bilirubin Total 11/24/2024 1.7 (H)     Alkaline Phosphatase 11/24/2024 91     AST 11/24/2024 65 (H)     ALT 11/24/2024 102 (H)     Bilirubin Direct 11/24/2024 0.42 (H)     Lipase 11/24/2024 32     Magnesium 11/24/2024 1.7     Color Urine 11/24/2024 [place] (A)     Appearance Urine 11/24/2024 Clear     Glucose Urine 11/24/2024 Negative     Bilirubin Urine 11/24/2024 Negative     Ketones Urine 11/24/2024 " Negative     Specific Gravity Urine 11/24/2024 1.024     Blood Urine 11/24/2024 Negative     pH Urine 11/24/2024 6.5     Protein Albumin Urine 11/24/2024 20 (A)     Urobilinogen Urine 11/24/2024 <2.0     Nitrite Urine 11/24/2024 Negative     Leukocyte Esterase Urine 11/24/2024 Negative     RBC Urine 11/24/2024 1     WBC Urine 11/24/2024 <1     WBC Count 11/24/2024 8.3     RBC Count 11/24/2024 4.60     Hemoglobin 11/24/2024 14.8     Hematocrit 11/24/2024 41.9     MCV 11/24/2024 91     MCH 11/24/2024 32.2     MCHC 11/24/2024 35.3     RDW 11/24/2024 12.3     Platelet Count 11/24/2024 207     % Neutrophils 11/24/2024 65     % Lymphocytes 11/24/2024 20     % Monocytes 11/24/2024 11     % Eosinophils 11/24/2024 3     % Basophils 11/24/2024 1     % Immature Granulocytes 11/24/2024 1     NRBCs per 100 WBC 11/24/2024 0     Absolute Neutrophils 11/24/2024 5.4     Absolute Lymphocytes 11/24/2024 1.6     Absolute Monocytes 11/24/2024 0.9     Absolute Eosinophils 11/24/2024 0.3     Absolute Basophils 11/24/2024 0.1     Absolute Immature Granul* 11/24/2024 0.0     Absolute NRBCs 11/24/2024 0.0     Phosphorus 11/24/2024 3.2     Estimated Average Glucose 11/24/2024 108     Hemoglobin A1C 11/24/2024 5.4     Potassium 11/24/2024 3.5     Sodium 11/25/2024 133 (L)     Potassium 11/25/2024 4.1     Carbon Dioxide (CO2) 11/25/2024 25     Anion Gap 11/25/2024 13     Urea Nitrogen 11/25/2024 8.7     Creatinine 11/25/2024 1.08     GFR Estimate 11/25/2024 90     Calcium 11/25/2024 8.6 (L)     Chloride 11/25/2024 95 (L)     Glucose 11/25/2024 138 (H)     Alkaline Phosphatase 11/25/2024 93     AST 11/25/2024 53 (H)     ALT 11/25/2024 81 (H)     Protein Total 11/25/2024 7.9     Albumin 11/25/2024 4.9     Bilirubin Total 11/25/2024 1.0     WBC Count 11/25/2024 8.2     RBC Count 11/25/2024 4.29 (L)     Hemoglobin 11/25/2024 13.6     Hematocrit 11/25/2024 40.6     MCV 11/25/2024 95     MCH 11/25/2024 31.7     MCHC 11/25/2024 33.5     RDW 11/25/2024  12.3     Platelet Count 11/25/2024 202     Magnesium 11/25/2024 2.4 (H)     Phosphorus 11/25/2024 3.8     Bilirubin Direct 11/25/2024 0.28     GLUCOSE BY METER POCT 11/25/2024 134 (H)         NICHOLAS Meng [facility]  [id number] [place] [address] [phone]

## 2024-11-26 ENCOUNTER — PATIENT OUTREACH (OUTPATIENT)
Dept: INTERNAL MEDICINE | Facility: CLINIC | Age: 39
End: 2024-11-26
Payer: COMMERCIAL

## 2024-11-26 NOTE — TELEPHONE ENCOUNTER
Transitions of Care Outreach  Chief Complaint   Patient presents with    Hospital F/U       Most Recent Admission Date: 11/24/2024   Most Recent Admission Diagnosis: Hepatic steatosis - K76.0  History of hypertension - Z86.79  Acute appendicitis, unspecified acute appendicitis type - K35.80  Acute appendicitis with localized peritonitis, without perforation, abscess, or gangrene - K35.30     Most Recent Discharge Date: 11/25/2024   Most Recent Discharge Diagnosis: Acute appendicitis, unspecified acute appendicitis type - K35.80  Acute appendicitis with localized peritonitis, without perforation, abscess, or gangrene - K35.30  History of hypertension - Z86.79  Hepatic steatosis - K76.0     Transitions of Care Assessment    Discharge Assessment  How are you doing now that you are home?: doing well  How are your symptoms? (Red Flag symptoms escalate to triage hotline per guidelines): Improved  Do you know how to contact your clinic care team if you have future questions or changes to your health status? : Yes  Does the patient have their discharge instructions? : Yes  Does the patient have questions regarding their discharge instructions? : No  Were you started on any new medications or were there changes to any of your previous medications? : Yes  Does the patient have all of their medications?: Yes  Do you have questions regarding any of your medications? : No  Do you have all of your needed medical supplies or equipment (DME)?  (i.e. oxygen tank, CPAP, cane, etc.): Yes    Follow up Plan     Discharge Follow-Up  Discharge follow up appointment scheduled in alignment with recommended follow up timeframe or Transitions of Risk Category? (Low = within 30 days; Moderate= within 14 days; High= within 7 days): Yes  Discharge Follow Up Appointment Scheduled with?: Specialty Care Provider    Future Appointments   Date Time Provider Department Center   12/11/2024  9:30 AM Jaclyn López NP [facility]       Outpatient  Plan as outlined on AVS reviewed with patient.    For any urgent concerns, please contact our 24 hour nurse triage line: [phone] ([phone])       Anne Roberts RN

## 2024-11-27 ENCOUNTER — TELEPHONE (OUTPATIENT)
Dept: SURGERY | Facility: CLINIC | Age: 39
End: 2024-11-27
Payer: COMMERCIAL

## 2024-11-27 NOTE — TELEPHONE ENCOUNTER
[facility] Post-Op Phone Call                     Surgeon: Vladimir Bearden    Date of Surgery: 11/24/24  Surgery: Laparoscopic Appendectomy  Discharge Date: 11/25/24    Date/Time Called:   Date: 11/27/2024 Time: 9:21 AM   Attempt: First    Pain Control:  Intensity: Mild (1 - 3)  Duration/Location/Explain: tylenol and ibuprofen   What makes it better/worse?     Medications:  Narcotic Use - Yes  Drug type: Norco  Frequency: has taken 2 pills since discharge     Incisions:  Drainage? clean and dry  Any fever type symptoms? No  Comment:     GI:  Nausea? No  Vomiting? No  BM? Yes  Gas? Yes  Voiding Frequency? 4 or more/day   Appetite? Good    Activity:  Walking activity? Yes  Frequency/Type: as tolerated  Restrictions: activity as tolerated, avoid heavy lifting for 2 weeks    Return to Work Plans?  Expected date already on leave for a shoulder surgery   Do you need anything from us in this regard? No , will reach back out if he needs any paperwork    Post-op appointment made? No          Thank you for your time. Please do not hesitate to call us with any questions or concerns.    Call completed by: Sherry Fam RN

## 2024-12-03 ENCOUNTER — TRANSFERRED RECORDS (OUTPATIENT)
Dept: HEALTH INFORMATION MANAGEMENT | Facility: CLINIC | Age: 39
End: 2024-12-03
Payer: COMMERCIAL

## 2024-12-11 ENCOUNTER — OFFICE VISIT (OUTPATIENT)
Dept: INTERNAL MEDICINE | Facility: CLINIC | Age: 39
End: 2024-12-11
Payer: COMMERCIAL

## 2024-12-11 VITALS
WEIGHT: 264 LBS | TEMPERATURE: 98.1 F | HEIGHT: 68 IN | DIASTOLIC BLOOD PRESSURE: 74 MMHG | BODY MASS INDEX: 40.01 KG/M2 | HEART RATE: 76 BPM | SYSTOLIC BLOOD PRESSURE: 136 MMHG | RESPIRATION RATE: 14 BRPM | OXYGEN SATURATION: 98 %

## 2024-12-11 DIAGNOSIS — Z09 HOSPITAL DISCHARGE FOLLOW-UP: Primary | ICD-10-CM

## 2024-12-11 DIAGNOSIS — Z90.49 STATUS POST APPENDECTOMY: ICD-10-CM

## 2024-12-11 DIAGNOSIS — K76.0 HEPATIC STEATOSIS: ICD-10-CM

## 2024-12-11 DIAGNOSIS — R74.8 ELEVATED LIVER ENZYMES: ICD-10-CM

## 2024-12-11 DIAGNOSIS — E89.0 POSTABLATIVE HYPOTHYROIDISM: ICD-10-CM

## 2024-12-11 DIAGNOSIS — I10 PRIMARY HYPERTENSION: ICD-10-CM

## 2024-12-11 DIAGNOSIS — Z13.220 LIPID SCREENING: ICD-10-CM

## 2024-12-11 PROBLEM — Z86.79 HISTORY OF HYPERTENSION: Status: RESOLVED | Noted: 2024-11-25 | Resolved: 2024-12-11

## 2024-12-11 LAB
ALBUMIN SERPL BCG-MCNC: 4.7 G/DL (ref 3.5–5.2)
ALP SERPL-CCNC: 82 U/L (ref 40–150)
ALT SERPL W P-5'-P-CCNC: 126 U/L (ref 0–70)
ANION GAP SERPL CALCULATED.3IONS-SCNC: 10 MMOL/L (ref 7–15)
AST SERPL W P-5'-P-CCNC: 103 U/L (ref 0–45)
BILIRUB DIRECT SERPL-MCNC: 0.21 MG/DL (ref 0–0.3)
BILIRUB SERPL-MCNC: 0.6 MG/DL
BUN SERPL-MCNC: 15.3 MG/DL (ref 6–20)
CALCIUM SERPL-MCNC: 9.5 MG/DL (ref 8.8–10.4)
CHLORIDE SERPL-SCNC: 98 MMOL/L (ref 98–107)
CHOLEST SERPL-MCNC: 241 MG/DL
CREAT SERPL-MCNC: 1.13 MG/DL (ref 0.67–1.17)
CREAT UR-MCNC: 79.1 MG/DL
EGFRCR SERPLBLD CKD-EPI 2021: 85 ML/MIN/1.73M2
FASTING STATUS PATIENT QL REPORTED: YES
FASTING STATUS PATIENT QL REPORTED: YES
GLUCOSE SERPL-MCNC: 110 MG/DL (ref 70–99)
HCO3 SERPL-SCNC: 29 MMOL/L (ref 22–29)
HDLC SERPL-MCNC: 66 MG/DL
LDLC SERPL CALC-MCNC: 146 MG/DL
MICROALBUMIN UR-MCNC: <12 MG/L
MICROALBUMIN/CREAT UR: NORMAL MG/G{CREAT}
NONHDLC SERPL-MCNC: 175 MG/DL
POTASSIUM SERPL-SCNC: 4.2 MMOL/L (ref 3.4–5.3)
PROT SERPL-MCNC: 7.6 G/DL (ref 6.4–8.3)
SODIUM SERPL-SCNC: 137 MMOL/L (ref 135–145)
T4 FREE SERPL-MCNC: 1.22 NG/DL (ref 0.9–1.7)
TRIGL SERPL-MCNC: 144 MG/DL
TSH SERPL DL<=0.005 MIU/L-ACNC: 9.16 UIU/ML (ref 0.3–4.2)

## 2024-12-11 PROCEDURE — 36415 COLL VENOUS BLD VENIPUNCTURE: CPT | Performed by: NURSE PRACTITIONER

## 2024-12-11 PROCEDURE — 84443 ASSAY THYROID STIM HORMONE: CPT | Performed by: NURSE PRACTITIONER

## 2024-12-11 PROCEDURE — 83550 IRON BINDING TEST: CPT | Performed by: NURSE PRACTITIONER

## 2024-12-11 PROCEDURE — 83540 ASSAY OF IRON: CPT | Performed by: NURSE PRACTITIONER

## 2024-12-11 PROCEDURE — 82043 UR ALBUMIN QUANTITATIVE: CPT | Performed by: NURSE PRACTITIONER

## 2024-12-11 PROCEDURE — 82570 ASSAY OF URINE CREATININE: CPT | Performed by: NURSE PRACTITIONER

## 2024-12-11 PROCEDURE — 80053 COMPREHEN METABOLIC PANEL: CPT | Performed by: NURSE PRACTITIONER

## 2024-12-11 PROCEDURE — 82248 BILIRUBIN DIRECT: CPT | Performed by: NURSE PRACTITIONER

## 2024-12-11 PROCEDURE — 80061 LIPID PANEL: CPT | Performed by: NURSE PRACTITIONER

## 2024-12-11 PROCEDURE — 99214 OFFICE O/P EST MOD 30 MIN: CPT | Performed by: NURSE PRACTITIONER

## 2024-12-11 PROCEDURE — 84439 ASSAY OF FREE THYROXINE: CPT | Performed by: NURSE PRACTITIONER

## 2024-12-11 PROCEDURE — G2211 COMPLEX E/M VISIT ADD ON: HCPCS | Performed by: NURSE PRACTITIONER

## 2024-12-11 PROCEDURE — 86803 HEPATITIS C AB TEST: CPT | Performed by: NURSE PRACTITIONER

## 2024-12-11 NOTE — ASSESSMENT & PLAN NOTE
Reviewed new diagnosis of nonalcoholic fatty liver disease.  He has gained weight since he had a shoulder injury and has been unable to work.  Encouraged both increased exercise and reduction in caloric intake.  Monitor liver enzymes every 6 months.  -Repeat hepatic profile today  -Repeat lipid screening today

## 2024-12-11 NOTE — PROGRESS NOTES
"  Assessment & Plan   Problem List Items Addressed This Visit       Postablative hypothyroidism     Euthyroid with normal free T4 level         Relevant Orders    TSH with free T4 reflex    Primary hypertension     Blood pressure is slightly above goal, would like to see his readings less than 130/80  - Continue hydrochlorothiazide 25 mg daily, this is a relatively new medication for him  - He is advised to check his blood pressure at home regularly.  If readings are consistently greater than 130/80, consider changing his medication to chlorthalidone 25 mg daily  - Check a UACR to screen for CKD          Relevant Orders    Albumin Random Urine Quantitative with Creat Ratio    Basic metabolic panel    Status post appendectomy 11/2024    Hepatic steatosis     Reviewed new diagnosis of nonalcoholic fatty liver disease.  He has gained weight since he had a shoulder injury and has been unable to work.  Encouraged both increased exercise and reduction in caloric intake.  Monitor liver enzymes every 6 months.  -Repeat hepatic profile today  -Repeat lipid screening today         Relevant Orders    Hepatic panel (Albumin, ALT, AST, Bili, Alk Phos, TP)     Other Visit Diagnoses       Hospital discharge follow-up    -  Primary    Lipid screening        Relevant Orders    Lipid panel reflex to direct LDL Fasting               BMI  Estimated body mass index is 40.14 kg/m  as calculated from the following:    Height as of this encounter: 1.727 m (5' 8\").    Weight as of this encounter: 119.7 kg (264 lb).         Subjective   Jordan is a 39 year old, presenting for the following health issues:  Hospital F/U ([facility] 11/24-11/25 Appendicitis )        12/11/2024     9:17 AM   Additional Questions   Roomed by Lauren HOFFMAN   Hospital Follow-up Visit:  Hospital/Nursing Home/IP Rehab Facility: [facility]  Date of Admission: 11/24/24  Date of Discharge: 11/25/24  Reason(s) for Admission: Appendicitis   Was the " "patient in the ICU or did the patient experience delirium during hospitalization?  No  Do you have any other stressors you would like to discuss with your provider? No  Problems taking medications regularly:  None  Medication changes since discharge: None  Problems adhering to non-medication therapy:  None    He was seen in the emergency department on 11/24 with acute appendicitis and underwent appendectomy. Has not had to take any pain medication for the past 1.5 weeks. Bms regular.     HTN- blood pressure readings at home have been in the 140s/80s.         Objective    /74   Pulse 76   Temp 98.1  F (36.7  C) (Oral)   Resp 14   Ht 1.727 m (5' 8\")   Wt 119.7 kg (264 lb)   SpO2 98%   BMI 40.14 kg/m    Body mass index is 40.14 kg/m .    Wt Readings from Last 5 Encounters:   12/11/24 119.7 kg (264 lb)   11/24/24 115.2 kg (253 lb 14.4 oz)   10/23/24 113 kg (249 lb 1.6 oz)   09/27/23 101.2 kg (223 lb)   09/27/22 100.5 kg (221 lb 9.6 oz)       Physical Exam   GENERAL: alert and no distress  RESP: Normal inspiratory and expiratory effort  ABDOMEN: soft, non-tender.  Abdominal incisions are healing well          The longitudinal plan of care for the diagnosis(es)/condition(s) as documented were addressed during this visit. Due to the added complexity in care, I will continue to support Jordan in the subsequent management and with ongoing continuity of care.  Signed Electronically by: Jaclyn López NP    "

## 2024-12-11 NOTE — ASSESSMENT & PLAN NOTE
Blood pressure is slightly above goal, would like to see his readings less than 130/80  - Continue hydrochlorothiazide 25 mg daily, this is a relatively new medication for him  - He is advised to check his blood pressure at home regularly.  If readings are consistently greater than 130/80, consider changing his medication to chlorthalidone 25 mg daily  - Check a UACR to screen for CKD

## 2024-12-11 NOTE — PATIENT INSTRUCTIONS
Goal BP is below 130/80. Let me know if readings are consistently above this and we could consider switching to chlorthalidone which is in the same drug class but just a little stronger

## 2024-12-12 LAB
HCV AB SERPL QL IA: NONREACTIVE
IRON BINDING CAPACITY (ROCHE): 332 UG/DL (ref 240–430)
IRON SATN MFR SERPL: 28 % (ref 15–46)
IRON SERPL-MCNC: 93 UG/DL (ref 61–157)

## 2024-12-17 LAB
PATH REPORT.COMMENTS IMP SPEC: NORMAL
PATH REPORT.COMMENTS IMP SPEC: NORMAL
PATH REPORT.FINAL DX SPEC: NORMAL
PATH REPORT.GROSS SPEC: NORMAL
PATH REPORT.MICROSCOPIC SPEC OTHER STN: NORMAL
PATH REPORT.RELEVANT HX SPEC: NORMAL
PHOTO IMAGE: NORMAL

## 2024-12-17 PROCEDURE — 88304 TISSUE EXAM BY PATHOLOGIST: CPT | Mod: 26 | Performed by: PATHOLOGY

## 2024-12-19 ENCOUNTER — TELEPHONE (OUTPATIENT)
Dept: INTERNAL MEDICINE | Facility: CLINIC | Age: 39
End: 2024-12-19
Payer: COMMERCIAL

## 2024-12-19 NOTE — TELEPHONE ENCOUNTER
----- Message from Jaclyn López sent at 12/19/2024 12:51 PM CST -----  Call pt with lab results which have not been reviewed on [email or web address] Jordan,  Your liver enzymes have actually increased compared to what they were 2 weeks ago.  Since you are not having any abdominal symptoms and we already know about the hepatic steatosis, my advice is to avoid alcohol and work on weight loss. Let me know if you would like to see a dietician if you would like help with diet changes. I think we should recheck you liver tests again in another 1 month to make sure they start going down.  If you start to develop any abdominal pain, please follow-up earlier than this.     Electrolytes are back to normal.     Your free T4 level for your thyroid is within a normal range still so no changes to your levothyroxine medication despite the fact that your TSH is elevated.     Cholesterol levels are higher than they were a couple of years ago.  A Mediterranean diet is a good way to help with both weight loss and to lower your cholesterol levels.  Once again, let me know if you like to see a dietitian if this would be helpful to work on diet measures to lower cholesterol and help with fatty liver.

## 2024-12-19 NOTE — TELEPHONE ENCOUNTER
1A - VM full could not leave message if patient calls back please relay message from PCP.    Letter mailed out via [employer] and mail

## 2025-01-29 DIAGNOSIS — I10 PRIMARY HYPERTENSION: ICD-10-CM

## 2025-01-29 RX ORDER — HYDROCHLOROTHIAZIDE 25 MG/1
25 TABLET ORAL DAILY
Qty: 90 TABLET | Refills: 0 | Status: SHIPPED | OUTPATIENT
Start: 2025-01-29

## 2025-04-14 ENCOUNTER — TRANSFERRED RECORDS (OUTPATIENT)
Dept: HEALTH INFORMATION MANAGEMENT | Facility: CLINIC | Age: 40
End: 2025-04-14
Payer: COMMERCIAL

## 2025-04-30 ENCOUNTER — MYC REFILL (OUTPATIENT)
Dept: INTERNAL MEDICINE | Facility: CLINIC | Age: 40
End: 2025-04-30
Payer: COMMERCIAL

## 2025-04-30 DIAGNOSIS — I10 PRIMARY HYPERTENSION: ICD-10-CM

## 2025-04-30 RX ORDER — HYDROCHLOROTHIAZIDE 25 MG/1
25 TABLET ORAL DAILY
Qty: 90 TABLET | Refills: 1 | Status: SHIPPED | OUTPATIENT
Start: 2025-04-30

## 2025-07-02 ENCOUNTER — TELEPHONE (OUTPATIENT)
Dept: INTERNAL MEDICINE | Facility: CLINIC | Age: 40
End: 2025-07-02

## 2025-07-02 ENCOUNTER — NURSE TRIAGE (OUTPATIENT)
Dept: FAMILY MEDICINE | Facility: CLINIC | Age: 40
End: 2025-07-02

## 2025-07-02 ENCOUNTER — HOSPITAL ENCOUNTER (OUTPATIENT)
Dept: ULTRASOUND IMAGING | Facility: HOSPITAL | Age: 40
Discharge: HOME | End: 2025-07-02
Attending: STUDENT IN AN ORGANIZED HEALTH CARE EDUCATION/TRAINING PROGRAM
Payer: COMMERCIAL

## 2025-07-02 ENCOUNTER — OFFICE VISIT (OUTPATIENT)
Dept: PEDIATRICS | Facility: CLINIC | Age: 40
End: 2025-07-02
Payer: COMMERCIAL

## 2025-07-02 VITALS
OXYGEN SATURATION: 94 % | RESPIRATION RATE: 16 BRPM | DIASTOLIC BLOOD PRESSURE: 92 MMHG | SYSTOLIC BLOOD PRESSURE: 138 MMHG | TEMPERATURE: 98.3 F | HEART RATE: 95 BPM | WEIGHT: 241.5 LBS | BODY MASS INDEX: 36.72 KG/M2

## 2025-07-02 DIAGNOSIS — E80.6 HYPERBILIRUBINEMIA: ICD-10-CM

## 2025-07-02 DIAGNOSIS — Z86.39 HISTORY OF GRAVES' DISEASE: ICD-10-CM

## 2025-07-02 DIAGNOSIS — R42 DIZZINESS: ICD-10-CM

## 2025-07-02 DIAGNOSIS — T67.9XXA HEAT EXPOSURE, INITIAL ENCOUNTER: Primary | ICD-10-CM

## 2025-07-02 DIAGNOSIS — R25.2 MUSCLE CRAMPS: ICD-10-CM

## 2025-07-02 DIAGNOSIS — I10 PRIMARY HYPERTENSION: ICD-10-CM

## 2025-07-02 DIAGNOSIS — E86.0 DEHYDRATION: ICD-10-CM

## 2025-07-02 LAB
ALBUMIN SERPL-MCNC: 4.7 G/DL (ref 3.4–5)
ALP SERPL-CCNC: 61 U/L (ref 40–150)
ALT SERPL W P-5'-P-CCNC: 63 U/L (ref 0–70)
ANION GAP SERPL CALCULATED.3IONS-SCNC: 13 MMOL/L (ref 3–14)
AST SERPL W P-5'-P-CCNC: 75 U/L (ref 0–45)
BILIRUB SERPL-MCNC: 2.2 MG/DL (ref 0.2–1.3)
BUN SERPL-MCNC: 17 MG/DL (ref 7–30)
CALCIUM SERPL-MCNC: 10 MG/DL (ref 8.5–10.1)
CHLORIDE BLD-SCNC: 91 MMOL/L (ref 94–109)
CK SERPL-CCNC: 147 U/L (ref 39–308)
CO2 SERPL-SCNC: 31 MMOL/L (ref 20–32)
CREAT SERPL-MCNC: 1.2 MG/DL (ref 0.66–1.25)
EGFRCR SERPLBLD CKD-EPI 2021: 78 ML/MIN/1.73M2
ERYTHROCYTE [DISTWIDTH] IN BLOOD BY AUTOMATED COUNT: 12.3 % (ref 10–15)
GLUCOSE BLD-MCNC: 107 MG/DL (ref 70–99)
HCT VFR BLD AUTO: 45.8 % (ref 40–53)
HGB BLD-MCNC: 16.3 G/DL (ref 13.3–17.7)
MCH RBC QN AUTO: 33.1 PG (ref 26.5–33)
MCHC RBC AUTO-ENTMCNC: 35.6 G/DL (ref 31.5–36.5)
MCV RBC AUTO: 93 FL (ref 78–100)
PLATELET # BLD AUTO: 237 10E3/UL (ref 150–450)
POTASSIUM BLD-SCNC: 3.6 MMOL/L (ref 3.4–5.3)
PROT SERPL-MCNC: 8.5 G/DL (ref 6.8–8.8)
RBC # BLD AUTO: 4.92 10E6/UL (ref 4.4–5.9)
SODIUM SERPL-SCNC: 135 MMOL/L (ref 135–145)
T4 FREE SERPL-MCNC: 1.54 NG/DL (ref 0.9–1.7)
TSH SERPL DL<=0.005 MIU/L-ACNC: 6.73 UIU/ML (ref 0.3–4.2)
WBC # BLD AUTO: 6.4 10E3/UL (ref 4–11)

## 2025-07-02 PROCEDURE — 84439 ASSAY OF FREE THYROXINE: CPT | Performed by: STUDENT IN AN ORGANIZED HEALTH CARE EDUCATION/TRAINING PROGRAM

## 2025-07-02 PROCEDURE — 82550 ASSAY OF CK (CPK): CPT | Performed by: STUDENT IN AN ORGANIZED HEALTH CARE EDUCATION/TRAINING PROGRAM

## 2025-07-02 PROCEDURE — 36415 COLL VENOUS BLD VENIPUNCTURE: CPT | Performed by: STUDENT IN AN ORGANIZED HEALTH CARE EDUCATION/TRAINING PROGRAM

## 2025-07-02 PROCEDURE — 99215 OFFICE O/P EST HI 40 MIN: CPT | Mod: 25 | Performed by: STUDENT IN AN ORGANIZED HEALTH CARE EDUCATION/TRAINING PROGRAM

## 2025-07-02 PROCEDURE — 80053 COMPREHEN METABOLIC PANEL: CPT | Performed by: STUDENT IN AN ORGANIZED HEALTH CARE EDUCATION/TRAINING PROGRAM

## 2025-07-02 PROCEDURE — 3075F SYST BP GE 130 - 139MM HG: CPT | Performed by: STUDENT IN AN ORGANIZED HEALTH CARE EDUCATION/TRAINING PROGRAM

## 2025-07-02 PROCEDURE — 96360 HYDRATION IV INFUSION INIT: CPT | Performed by: STUDENT IN AN ORGANIZED HEALTH CARE EDUCATION/TRAINING PROGRAM

## 2025-07-02 PROCEDURE — 76705 ECHO EXAM OF ABDOMEN: CPT

## 2025-07-02 PROCEDURE — 84443 ASSAY THYROID STIM HORMONE: CPT | Performed by: STUDENT IN AN ORGANIZED HEALTH CARE EDUCATION/TRAINING PROGRAM

## 2025-07-02 PROCEDURE — 85027 COMPLETE CBC AUTOMATED: CPT | Performed by: STUDENT IN AN ORGANIZED HEALTH CARE EDUCATION/TRAINING PROGRAM

## 2025-07-02 PROCEDURE — 3080F DIAST BP >= 90 MM HG: CPT | Performed by: STUDENT IN AN ORGANIZED HEALTH CARE EDUCATION/TRAINING PROGRAM

## 2025-07-02 RX ORDER — HYDROCHLOROTHIAZIDE 50 MG/1
50 TABLET ORAL DAILY
Qty: 30 TABLET | Refills: 0 | Status: SHIPPED | OUTPATIENT
Start: 2025-07-02

## 2025-07-02 RX ADMIN — Medication 1000 ML: at 13:19

## 2025-07-02 NOTE — TELEPHONE ENCOUNTER
Spoke to pt regarding being seen with [facility] today (July 2, 2025) at 12pm. Pt accepts [facility].     Amilcar Elliott (VF [facility])

## 2025-07-02 NOTE — TELEPHONE ENCOUNTER
"RN took call from patient.    Patient felt he experienced heat stroke on Monday. RN advised patient to call when he first begins to experience symptoms in the future. Patient verbalized understanding.     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   Felt he was experiencing Mild heat stroke towards end of the day at work.   Patient was cramping up, jaw locked, pressure in ear  -Patient experiencing very bad muscle cramps in hands.     Patient unable to take in fluids  Patient vomited 4 times Monday but has not experienced episodes since then.     Ice packs, cold bath, felt symptoms improve slightly    Patient able to take in small amounts of fluids on Tuesday, ate one meal. Fluid intake began to improve.     Today     Patient still experiencing intermittent muscle cramps in hands and feet  \"Feels out of it\" - if he stands up too fast experiences dizziness. Disoriented \"Not really firing on all cylinders.\"  Is experiencing pain on right side of abdomen under ribs and lower left side of back.     Patient has history of high blood pressure, has been sweating a lot since Monday.   Patient takes blood pressure medication, monitors BP at home.   -This morning before taking medication 144/104 . Patient took medication and BP was 130/90,     Patient is able to take in fluids. Drank pedialyte, water. Patient feels like he is is unable to catch up & is very thirsty.     Denies chest pain, difficulty breathing, unsteady on feet blurred vision, fever.     RN advised patient needs to be seen today. Will contact [facility] to see if they are willing to accept patient. Will return call to patient after speaking with them. Patient verbalized understanding and agreeable to plan.     RN called [facility] and spoke to Amilcar to see if they would be willing to take patient. Amilcar informed [facility] is willing to accept patient. Will contact patient directly.     Patient is " scheduled today at 12:00 PM.   Future Appointments 7/2/2025 - 12/29/2025        Date Visit Type Length Department Provider     7/2/2025 12:00 PM DIAGNOSTIC 150 min [facility] Juanis Mims DO    Location Instructions:     [facility] is in [address] of the [facility], which is across the street from [facility]. This is southeast of the intersection of [address] [facility] and [address] Free parking is available; access the lot by turning south from [facility] or north from [facility] onto [facility]. From the main entrance, the clinic is to the left on the first floor.                       WILSON Liriano, RN  [facility]    Additional Information   Negative: Fever > 104 F (40 C)   Negative: Unconscious or difficult to awaken   Negative: Acting confused (e.g., disoriented, slurred speech)   Negative: Seizure has occurred   Negative: Very weak (can't stand)   Negative: Has fainted (passed out)   Negative: Sounds like a life-threatening emergency to the triager   Negative: Heat rash is the main concern   Negative: Swelling of both ankles (i.e., pedal edema) and caused by hot weather   Negative: Unable to walk or can only walk with assistance (e.g., requires support)   Negative: Fever > 103 F (39.4 C)   Negative: Vomiting interferes with drinking fluids   Negative: Sounds like a life-threatening emergency to the triager   Negative: Pregnant > 20 weeks or postpartum (< 6 weeks after delivery) and new hand or face swelling   Negative: Pregnant > 20 weeks and BP > 140/90    Protocols used: Heat Exposure (Heat Exhaustion and Heat Stroke)-A-OH, High Blood Pressure-A-OH

## 2025-07-02 NOTE — Clinical Note
Hi, seen in [facility] today.  I increase his HCTZ to 50 mg.  Advised him to follow-up with you in the next 1 to 2 months for ongoing management of his fatty liver and blood pressure.  Thanks!

## 2025-07-02 NOTE — LETTER
2025    Gustavo Becerra   1985        To Whom it May Concern;    Please excuse Gustavo Becerra from work/school for a healthcare visit on 2025. Please excuse patient from work until 2025 as he recuperates.     Sincerely,        Juanis Mims, DO

## 2025-07-02 NOTE — PROGRESS NOTES
[facility] Visit    Assessment & Plan      Diagnosis Comments   1. Heat exposure, initial encounter  CBC with platelets, Comprehensive metabolic panel, sodium chloride 0.9% BOLUS 1,000 mL, TSH with free T4 reflex, CK total, T4 free       2. Dehydration  sodium chloride 0.9% BOLUS 1,000 mL, TSH with free T4 reflex, T4 free       3. Muscle cramps  sodium chloride 0.9% BOLUS 1,000 mL, TSH with free T4 reflex, CK total, T4 free       4. Dizziness        5. History of Graves' disease  TSH with free T4 reflex, T4 free       6. Hyperbilirubinemia  US Abdomen Limited       7. Primary hypertension  hydrochlorothiazide (HYDRODIURIL) 50 MG tablet             Vitals are stable except for elevated blood pressure at 137/101  Neurologically intact.  Physical exam is benign  Symptoms do seem to be consistent with heat illness.  Will get CMP, CBC and CK to assess for any endorgan damage with heat illness.  Patient does not sound like he has been keeping up on fluid intake.  Will give IVF.  No urgent indication to do brain imaging today  Next steps based on initial workup    4 PM:  Patient felt much improved after the IV fluids.  Felt less dizzy.  Blood work is reassuring.  No evidence of rhabdomyolysis.  Advised patient to continue to drink fluids consistently.  It is okay if his appetite is not back to normal quite yet.  It may take him 1 to 2 weeks to return to baseline  TSH is still pending.  Will send him a message once that results  CMP did show elevated bilirubin.  Did liver ultrasound which showed significant hepatic steatosis.  Patient is aware of this diagnosis.  Reviewed dangers of uncontrolled, progressive fatty liver.  Reviewed that it can result in liver failure.  Strongly advised him to follow-up with his PCP to discuss weight loss.  Discussed minimizing Tylenol usage and getting thyroid disease under control.  He is receptive to this information  Advised minimizing/abstaining from alcohol.   Blood pressure on repeat is also elevated at 138/92.  Will increase hydrochlorothiazide to 50 mg and have him follow-up with PCP.      40 minutes were spent doing chart review, history and exam, documentation and further activities per the note.       Subjective   Jordan is a 40 year old, presenting for the following health issues:  Heat Exposure (Since 6/30/25/Pt was in [place], Pt had a baseball tournament this past weekend and at his job on Monday - pt started cramping on his neck, pt drunk water too fast and threw it up. Pt continued with his day, pt's hands started to cramp. Cramping on his toes, and jaw which radiate to the pt's right ear (pain - hurt for a split second). Back and sides are hurting too. Pt kept throwing up after eating/drinking water. Sweating easily lately. Pt states that if he lays down and stands up fast - dizzy)        12/11/2024     9:17 AM   Additional Questions   Roomed by Lauren HOFFMAN      Patient is a pleasant 40-year-old male with PMH of appendectomy, HTN, morbid obesity, history of Graves' disease who presents today for dizziness, muscle cramps and concern for heatstroke    Patient was in [place] last week.  [place] were [place].  They spend a lot of time in the pool and outdoors.  He notes it was greater than 100 degrees there.  He did not feel sick in the [place].  Then, they came back to [place] and over the weekend, had 2 back-to-back baseball games Saturday and Sunday.  They were outdoors for 5 to 6 hours at a time.  He felt like he was staying up on his hydration.    Then, on Monday, he went back to work.  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  He was sweating profusely throughout the day.  His neck started to cramp up towards the end of the 8-hour shift.  Then, on the way home, his jaw and hand started to cramp up.  His feet, legs were also cramping up.  He had a very hard time driving back home.  He called his wife who is  "an MA and the provider she works with advised ice baths, cold compresses to bring the temperature down.  He did submerse himself in an ice bath and that did help with the symptoms.  However, he was unable to drink or eat.  He attempted to drink water and vomited several times.  Over the last few days, symptoms have continued to improve.  He has been able to keep water down more consistently starting yesterday.  Has been taking cold showers and overall severity and frequency of the cramping has improved.  However, he still feels like he is getting very warm and sweaty with minimal exertion.  He has been trying to stay indoors and take it easy.  He feels like he is getting dizzy with position changes.  He has not been able to keep up on his food intake.  He does not have any headaches, neurodeficits, confusion associated with this.  Has not been having any chills.  She subjectively feels warm.    He just feels \"off\".  Worries about being dehydrated.  Cramping in his legs continues to be a concern.        Review of Systems  As per HPI       Objective    BP (!) 137/101   Pulse 95   Temp 98.3  F (36.8  C) (Oral)   Resp 16   Wt 109.5 kg (241 lb 8 oz)   SpO2 94%   BMI 36.72 kg/m    Body mass index is 36.72 kg/m .  Physical Exam   GENERAL: alert and no distress, (+) cental obesity  EYES: Eyes grossly normal to inspection, PERRL and conjunctivae and sclerae normal  HENT: ear canals and TM's normal, nose and mouth without ulcers or lesions  NECK: no adenopathy, no asymmetry, masses, or scars  RESP: lungs clear to auscultation - no rales, rhonchi or wheezes  CV: regular rate and rhythm, no murmur, click or rub, no peripheral edema  ABDOMEN: soft, nontender, no hepatosplenomegaly, no masses and bowel sounds normal  MS: no gross musculoskeletal defects noted, no edema  Neuro: Cranial nerves II through XII intact.  Muscle strength 5/5 in all 4 extremities.  Sensation is equal and intact bilaterally in all 4 extremities and " along the face.  No ataxic gait.  No dysdiadochokinesia, dysarthria, facial droop.  No garbled speech.  PSYCH: mentation appears normal, affect anxious        Signed Electronically by: Juanis Mims DO

## 2025-08-10 ENCOUNTER — HEALTH MAINTENANCE LETTER (OUTPATIENT)
Age: 40
End: 2025-08-10

## 2025-08-22 DIAGNOSIS — I10 PRIMARY HYPERTENSION: ICD-10-CM

## 2025-08-25 RX ORDER — HYDROCHLOROTHIAZIDE 50 MG/1
50 TABLET ORAL DAILY
Qty: 90 TABLET | Refills: 0 | Status: SHIPPED | OUTPATIENT
Start: 2025-08-25

## (undated) DEVICE — VIAL DECANTER STERILE WHITE DYNJDEC06

## (undated) DEVICE — BLADE KNIFE SURG 11 371111

## (undated) DEVICE — NEEDLE HYPO 18X1-1/2 SAFETY 305918

## (undated) DEVICE — SOL WATER IRRIG 1000ML BOTTLE 2F7114

## (undated) DEVICE — STPL ENDO LINEAR CUT ARTICULATING 45MM ATS45

## (undated) DEVICE — CABLE ESURG 10FT MNPLR UNPLR HFRQ REUSE 8106.033

## (undated) DEVICE — SOL NACL 0.9% IRRIG 1000ML BOTTLE 2F7124

## (undated) DEVICE — SU VICRYL+ 3-0 27IN SH UND VCP416H

## (undated) DEVICE — PREP CHLORAPREP 26ML TINTED HI-LITE ORANGE 930815

## (undated) DEVICE — SU DERMABOND ADVANCED .7ML DNX12

## (undated) DEVICE — ENDO TROCAR SLEEVE KII Z-THREADED 05X100MM CTS02

## (undated) DEVICE — GLOVE UNDER INDICATOR PI SZ 7.0 LF 41670

## (undated) DEVICE — TUBING SMOKE EVAC PNEUMOCLEAR HIGH FLOW 0620050250

## (undated) DEVICE — SUCTION MANIFOLD NEPTUNE 2 SYS 1 PORT 702-025-000

## (undated) DEVICE — ENDO TROCAR FIRST ENTRY KII FIOS Z-THRD 05X100MM CTF03

## (undated) DEVICE — ENDO POUCH UNIV RETRIEVAL SYSTEM INZII 10MM CD001

## (undated) DEVICE — ENDO TROCAR FIRST ENTRY KII FIOS Z-THRD 12X100MM CTF73

## (undated) DEVICE — STPL ENDO RELOAD 45X3.5MM 6R45B

## (undated) DEVICE — CUSTOM PACK LAP CHOLE SBA5BLCHEA

## (undated) DEVICE — WECK EFX CONES AND SUTURE PASSER EFXCT1

## (undated) DEVICE — SUTURE VICRYL+ 0 27IN CT-1 UND VCP260H

## (undated) DEVICE — ESU LIGASURE MARYLAND LAPAROSCOPIC SLR/DVDR 5MMX37CM LF1937

## (undated) DEVICE — TUBING SUCTION MEDI-VAC 1/4"X20' N620A

## (undated) DEVICE — NDL INSUFFLATION 13GA 120MM C2201

## (undated) DEVICE — ESU GROUND PAD ADULT REM W/15' CORD E7507DB

## (undated) RX ORDER — LIDOCAINE HYDROCHLORIDE AND EPINEPHRINE 10; 10 MG/ML; UG/ML
INJECTION, SOLUTION INFILTRATION; PERINEURAL
Status: DISPENSED
Start: 2024-11-24

## (undated) RX ORDER — ONDANSETRON 2 MG/ML
INJECTION INTRAMUSCULAR; INTRAVENOUS
Status: DISPENSED
Start: 2024-11-24

## (undated) RX ORDER — LIDOCAINE HYDROCHLORIDE 10 MG/ML
INJECTION, SOLUTION EPIDURAL; INFILTRATION; INTRACAUDAL; PERINEURAL
Status: DISPENSED
Start: 2024-11-24

## (undated) RX ORDER — DEXAMETHASONE SODIUM PHOSPHATE 10 MG/ML
INJECTION, SOLUTION INTRAMUSCULAR; INTRAVENOUS
Status: DISPENSED
Start: 2024-11-24

## (undated) RX ORDER — PROPOFOL 10 MG/ML
INJECTION, EMULSION INTRAVENOUS
Status: DISPENSED
Start: 2024-11-24

## (undated) RX ORDER — FENTANYL CITRATE 50 UG/ML
INJECTION, SOLUTION INTRAMUSCULAR; INTRAVENOUS
Status: DISPENSED
Start: 2024-11-24